# Patient Record
Sex: FEMALE | Race: WHITE | NOT HISPANIC OR LATINO | Employment: OTHER | URBAN - METROPOLITAN AREA
[De-identification: names, ages, dates, MRNs, and addresses within clinical notes are randomized per-mention and may not be internally consistent; named-entity substitution may affect disease eponyms.]

---

## 2017-01-24 ENCOUNTER — OFFICE VISIT (OUTPATIENT)
Dept: RETAIL CLINIC | Facility: CLINIC | Age: 62
End: 2017-01-24

## 2017-01-24 DIAGNOSIS — Z23 NEED FOR VACCINATION: Primary | ICD-10-CM

## 2017-01-24 NOTE — PROGRESS NOTES
Patient presents for flu vaccine, denies any allergies or problems with flu vaccine in past.  VIS given  See scanned forms.

## 2019-07-03 ENCOUNTER — HOSPITAL ENCOUNTER (INPATIENT)
Facility: HOSPITAL | Age: 64
LOS: 2 days | Discharge: HOME | End: 2019-07-05
Attending: EMERGENCY MEDICINE | Admitting: INTERNAL MEDICINE

## 2019-07-03 DIAGNOSIS — K92.1 MELENA: ICD-10-CM

## 2019-07-03 DIAGNOSIS — D62 ACUTE BLOOD LOSS ANEMIA: ICD-10-CM

## 2019-07-03 DIAGNOSIS — D64.9 ANEMIA, UNSPECIFIED TYPE: ICD-10-CM

## 2019-07-03 DIAGNOSIS — K92.2 GASTROINTESTINAL HEMORRHAGE, UNSPECIFIED GASTROINTESTINAL HEMORRHAGE TYPE: Primary | ICD-10-CM

## 2019-07-03 LAB
ABO GROUP BLD: NORMAL
ABO GROUP BLD: NORMAL
ALBUMIN SERPL-MCNC: 4 G/DL (ref 3.5–5.2)
ALBUMIN/GLOB SERPL: 1.7 G/DL
ALP SERPL-CCNC: 39 U/L (ref 39–117)
ALT SERPL W P-5'-P-CCNC: 8 U/L (ref 1–33)
ANION GAP SERPL CALCULATED.3IONS-SCNC: 14 MMOL/L (ref 5–15)
AST SERPL-CCNC: 12 U/L (ref 1–32)
BASOPHILS # BLD AUTO: 0.05 10*3/MM3 (ref 0–0.2)
BASOPHILS NFR BLD AUTO: 0.5 % (ref 0–1.5)
BILIRUB SERPL-MCNC: 0.3 MG/DL (ref 0.2–1.2)
BLD GP AB SCN SERPL QL: NEGATIVE
BUN BLD-MCNC: 12 MG/DL (ref 8–23)
BUN/CREAT SERPL: 13.8 (ref 7–25)
CALCIUM SPEC-SCNC: 8.9 MG/DL (ref 8.6–10.5)
CHLORIDE SERPL-SCNC: 105 MMOL/L (ref 98–107)
CO2 SERPL-SCNC: 22 MMOL/L (ref 22–29)
CREAT BLD-MCNC: 0.87 MG/DL (ref 0.57–1)
DEPRECATED RDW RBC AUTO: 47.6 FL (ref 37–54)
DEVELOPER EXPIRATION DATE: ABNORMAL
DEVELOPER LOT NUMBER: ABNORMAL
EOSINOPHIL # BLD AUTO: 0.05 10*3/MM3 (ref 0–0.4)
EOSINOPHIL NFR BLD AUTO: 0.5 % (ref 0.3–6.2)
ERYTHROCYTE [DISTWIDTH] IN BLOOD BY AUTOMATED COUNT: 16.9 % (ref 12.3–15.4)
EXPIRATION DATE: ABNORMAL
FECAL OCCULT BLOOD SCREEN, POC: POSITIVE
GFR SERPL CREATININE-BSD FRML MDRD: 65 ML/MIN/1.73
GLOBULIN UR ELPH-MCNC: 2.4 GM/DL
GLUCOSE BLD-MCNC: 138 MG/DL (ref 65–99)
HCT VFR BLD AUTO: 18.2 % (ref 34–46.6)
HGB BLD-MCNC: 5 G/DL (ref 12–15.9)
HOLD SPECIMEN: NORMAL
HOLD SPECIMEN: NORMAL
HYPOCHROMIA BLD QL: NORMAL
IMM GRANULOCYTES # BLD AUTO: 0.06 10*3/MM3 (ref 0–0.05)
IMM GRANULOCYTES NFR BLD AUTO: 0.6 % (ref 0–0.5)
LYMPHOCYTES # BLD AUTO: 1.98 10*3/MM3 (ref 0.7–3.1)
LYMPHOCYTES NFR BLD AUTO: 19.8 % (ref 19.6–45.3)
Lab: ABNORMAL
MCH RBC QN AUTO: 21.4 PG (ref 26.6–33)
MCHC RBC AUTO-ENTMCNC: 27.5 G/DL (ref 31.5–35.7)
MCV RBC AUTO: 77.8 FL (ref 79–97)
MONOCYTES # BLD AUTO: 0.59 10*3/MM3 (ref 0.1–0.9)
MONOCYTES NFR BLD AUTO: 5.9 % (ref 5–12)
NEGATIVE CONTROL: NEGATIVE
NEUTROPHILS # BLD AUTO: 7.26 10*3/MM3 (ref 1.7–7)
NEUTROPHILS NFR BLD AUTO: 72.7 % (ref 42.7–76)
NRBC BLD AUTO-RTO: 0.4 /100 WBC (ref 0–0.2)
PLAT MORPH BLD: NORMAL
PLATELET # BLD AUTO: 423 10*3/MM3 (ref 140–450)
PMV BLD AUTO: 9.7 FL (ref 6–12)
POSITIVE CONTROL: POSITIVE
POTASSIUM BLD-SCNC: 3.8 MMOL/L (ref 3.5–5.2)
PROT SERPL-MCNC: 6.4 G/DL (ref 6–8.5)
RBC # BLD AUTO: 2.34 10*6/MM3 (ref 3.77–5.28)
RH BLD: POSITIVE
RH BLD: POSITIVE
SODIUM BLD-SCNC: 141 MMOL/L (ref 136–145)
T&S EXPIRATION DATE: NORMAL
WBC MORPH BLD: NORMAL
WBC NRBC COR # BLD: 9.99 10*3/MM3 (ref 3.4–10.8)
WHOLE BLOOD HOLD SPECIMEN: NORMAL
WHOLE BLOOD HOLD SPECIMEN: NORMAL

## 2019-07-03 PROCEDURE — P9016 RBC LEUKOCYTES REDUCED: HCPCS

## 2019-07-03 PROCEDURE — 86901 BLOOD TYPING SEROLOGIC RH(D): CPT

## 2019-07-03 PROCEDURE — 86900 BLOOD TYPING SEROLOGIC ABO: CPT

## 2019-07-03 PROCEDURE — 85007 BL SMEAR W/DIFF WBC COUNT: CPT | Performed by: NURSE PRACTITIONER

## 2019-07-03 PROCEDURE — 86900 BLOOD TYPING SEROLOGIC ABO: CPT | Performed by: EMERGENCY MEDICINE

## 2019-07-03 PROCEDURE — 99285 EMERGENCY DEPT VISIT HI MDM: CPT

## 2019-07-03 PROCEDURE — 82270 OCCULT BLOOD FECES: CPT | Performed by: EMERGENCY MEDICINE

## 2019-07-03 PROCEDURE — 84466 ASSAY OF TRANSFERRIN: CPT | Performed by: INTERNAL MEDICINE

## 2019-07-03 PROCEDURE — 86923 COMPATIBILITY TEST ELECTRIC: CPT

## 2019-07-03 PROCEDURE — 36430 TRANSFUSION BLD/BLD COMPNT: CPT

## 2019-07-03 PROCEDURE — 82728 ASSAY OF FERRITIN: CPT | Performed by: INTERNAL MEDICINE

## 2019-07-03 PROCEDURE — 80053 COMPREHEN METABOLIC PANEL: CPT | Performed by: NURSE PRACTITIONER

## 2019-07-03 PROCEDURE — 85045 AUTOMATED RETICULOCYTE COUNT: CPT | Performed by: INTERNAL MEDICINE

## 2019-07-03 PROCEDURE — 86901 BLOOD TYPING SEROLOGIC RH(D): CPT | Performed by: EMERGENCY MEDICINE

## 2019-07-03 PROCEDURE — 85025 COMPLETE CBC W/AUTO DIFF WBC: CPT | Performed by: NURSE PRACTITIONER

## 2019-07-03 PROCEDURE — 83540 ASSAY OF IRON: CPT | Performed by: INTERNAL MEDICINE

## 2019-07-03 PROCEDURE — 86850 RBC ANTIBODY SCREEN: CPT | Performed by: EMERGENCY MEDICINE

## 2019-07-03 RX ORDER — PANTOPRAZOLE SODIUM 40 MG/10ML
80 INJECTION, POWDER, LYOPHILIZED, FOR SOLUTION INTRAVENOUS ONCE
Status: COMPLETED | OUTPATIENT
Start: 2019-07-03 | End: 2019-07-03

## 2019-07-03 RX ORDER — MELATONIN
2000 DAILY
COMMUNITY
End: 2019-11-12

## 2019-07-03 RX ORDER — SODIUM CHLORIDE 0.9 % (FLUSH) 0.9 %
10 SYRINGE (ML) INJECTION AS NEEDED
Status: DISCONTINUED | OUTPATIENT
Start: 2019-07-03 | End: 2019-07-05 | Stop reason: HOSPADM

## 2019-07-03 RX ORDER — ACETAMINOPHEN 500 MG
500 TABLET ORAL ONCE
Status: COMPLETED | OUTPATIENT
Start: 2019-07-03 | End: 2019-07-03

## 2019-07-03 RX ADMIN — ACETAMINOPHEN 500 MG: 500 TABLET, FILM COATED ORAL at 22:54

## 2019-07-03 RX ADMIN — PANTOPRAZOLE SODIUM 80 MG: 40 INJECTION, POWDER, FOR SOLUTION INTRAVENOUS at 21:40

## 2019-07-04 PROBLEM — R73.9 HYPERGLYCEMIA: Status: ACTIVE | Noted: 2019-07-04

## 2019-07-04 PROBLEM — D64.9 SYMPTOMATIC ANEMIA: Status: ACTIVE | Noted: 2019-07-04

## 2019-07-04 PROBLEM — D50.9 MICROCYTIC ANEMIA: Status: ACTIVE | Noted: 2019-07-04

## 2019-07-04 PROBLEM — M81.0 OSTEOPOROSIS: Status: ACTIVE | Noted: 2019-07-04

## 2019-07-04 PROBLEM — K92.2 GIB (GASTROINTESTINAL BLEEDING): Status: ACTIVE | Noted: 2019-07-04

## 2019-07-04 LAB
ANION GAP SERPL CALCULATED.3IONS-SCNC: 11 MMOL/L (ref 5–15)
BACTERIA UR QL AUTO: ABNORMAL /HPF
BILIRUB UR QL STRIP: NEGATIVE
BUN BLD-MCNC: 13 MG/DL (ref 8–23)
BUN/CREAT SERPL: 17.1 (ref 7–25)
CALCIUM SPEC-SCNC: 8.4 MG/DL (ref 8.6–10.5)
CHLORIDE SERPL-SCNC: 111 MMOL/L (ref 98–107)
CLARITY UR: ABNORMAL
CO2 SERPL-SCNC: 20 MMOL/L (ref 22–29)
COLOR UR: YELLOW
CREAT BLD-MCNC: 0.76 MG/DL (ref 0.57–1)
FERRITIN SERPL-MCNC: 4.44 NG/ML (ref 13–150)
FOLATE SERPL-MCNC: 9.95 NG/ML (ref 4.78–24.2)
GFR SERPL CREATININE-BSD FRML MDRD: 76 ML/MIN/1.73
GLUCOSE BLD-MCNC: 100 MG/DL (ref 65–99)
GLUCOSE UR STRIP-MCNC: NEGATIVE MG/DL
HCT VFR BLD AUTO: 23.9 % (ref 34–46.6)
HCT VFR BLD AUTO: 24 % (ref 34–46.6)
HCT VFR BLD AUTO: 25.7 % (ref 34–46.6)
HGB BLD-MCNC: 7 G/DL (ref 12–15.9)
HGB BLD-MCNC: 7 G/DL (ref 12–15.9)
HGB BLD-MCNC: 7.3 G/DL (ref 12–15.9)
HGB UR QL STRIP.AUTO: NEGATIVE
HYALINE CASTS UR QL AUTO: ABNORMAL /LPF
INR PPP: 1.12 (ref 0.85–1.16)
IRON 24H UR-MRATE: 13 MCG/DL (ref 37–145)
IRON SATN MFR SERPL: 3 % (ref 20–50)
KETONES UR QL STRIP: NEGATIVE
LEUKOCYTE ESTERASE UR QL STRIP.AUTO: ABNORMAL
NITRITE UR QL STRIP: NEGATIVE
PH UR STRIP.AUTO: 7.5 [PH] (ref 5–8)
POTASSIUM BLD-SCNC: 4.7 MMOL/L (ref 3.5–5.2)
PROT UR QL STRIP: NEGATIVE
PROTHROMBIN TIME: 13.9 SECONDS (ref 11.2–14.3)
RBC # UR: ABNORMAL /HPF
REF LAB TEST METHOD: ABNORMAL
RETICS # AUTO: 0.09 10*6/MM3 (ref 0.02–0.13)
RETICS/RBC NFR AUTO: 3.65 % (ref 0.7–1.9)
SODIUM BLD-SCNC: 142 MMOL/L (ref 136–145)
SP GR UR STRIP: 1.02 (ref 1–1.03)
SQUAMOUS #/AREA URNS HPF: ABNORMAL /HPF
TIBC SERPL-MCNC: 487 MCG/DL (ref 298–536)
TRANSFERRIN SERPL-MCNC: 327 MG/DL (ref 200–360)
UROBILINOGEN UR QL STRIP: ABNORMAL
VIT B12 BLD-MCNC: 261 PG/ML (ref 211–946)
WBC UR QL AUTO: ABNORMAL /HPF

## 2019-07-04 PROCEDURE — 85014 HEMATOCRIT: CPT | Performed by: INTERNAL MEDICINE

## 2019-07-04 PROCEDURE — P9016 RBC LEUKOCYTES REDUCED: HCPCS

## 2019-07-04 PROCEDURE — 99222 1ST HOSP IP/OBS MODERATE 55: CPT | Performed by: INTERNAL MEDICINE

## 2019-07-04 PROCEDURE — 85018 HEMOGLOBIN: CPT | Performed by: INTERNAL MEDICINE

## 2019-07-04 PROCEDURE — 82607 VITAMIN B-12: CPT | Performed by: INTERNAL MEDICINE

## 2019-07-04 PROCEDURE — 85610 PROTHROMBIN TIME: CPT | Performed by: INTERNAL MEDICINE

## 2019-07-04 PROCEDURE — 80048 BASIC METABOLIC PNL TOTAL CA: CPT | Performed by: INTERNAL MEDICINE

## 2019-07-04 PROCEDURE — 99223 1ST HOSP IP/OBS HIGH 75: CPT | Performed by: INTERNAL MEDICINE

## 2019-07-04 PROCEDURE — 86900 BLOOD TYPING SEROLOGIC ABO: CPT

## 2019-07-04 PROCEDURE — 36430 TRANSFUSION BLD/BLD COMPNT: CPT

## 2019-07-04 PROCEDURE — 82746 ASSAY OF FOLIC ACID SERUM: CPT | Performed by: INTERNAL MEDICINE

## 2019-07-04 PROCEDURE — 81001 URINALYSIS AUTO W/SCOPE: CPT | Performed by: NURSE PRACTITIONER

## 2019-07-04 PROCEDURE — 83036 HEMOGLOBIN GLYCOSYLATED A1C: CPT | Performed by: INTERNAL MEDICINE

## 2019-07-04 RX ORDER — SODIUM CHLORIDE 0.9 % (FLUSH) 0.9 %
3-10 SYRINGE (ML) INJECTION AS NEEDED
Status: DISCONTINUED | OUTPATIENT
Start: 2019-07-04 | End: 2019-07-05 | Stop reason: HOSPADM

## 2019-07-04 RX ORDER — MELATONIN
2000 DAILY
Status: DISCONTINUED | OUTPATIENT
Start: 2019-07-04 | End: 2019-07-05 | Stop reason: HOSPADM

## 2019-07-04 RX ORDER — SODIUM CHLORIDE 9 MG/ML
50 INJECTION, SOLUTION INTRAVENOUS ONCE
Status: COMPLETED | OUTPATIENT
Start: 2019-07-04 | End: 2019-07-04

## 2019-07-04 RX ORDER — SODIUM CHLORIDE 0.9 % (FLUSH) 0.9 %
3 SYRINGE (ML) INJECTION EVERY 12 HOURS SCHEDULED
Status: DISCONTINUED | OUTPATIENT
Start: 2019-07-04 | End: 2019-07-05 | Stop reason: HOSPADM

## 2019-07-04 RX ORDER — ACETAMINOPHEN 325 MG/1
650 TABLET ORAL EVERY 4 HOURS PRN
Status: DISCONTINUED | OUTPATIENT
Start: 2019-07-04 | End: 2019-07-05 | Stop reason: HOSPADM

## 2019-07-04 RX ORDER — PANTOPRAZOLE SODIUM 40 MG/10ML
40 INJECTION, POWDER, LYOPHILIZED, FOR SOLUTION INTRAVENOUS EVERY 12 HOURS SCHEDULED
Status: DISCONTINUED | OUTPATIENT
Start: 2019-07-04 | End: 2019-07-05 | Stop reason: HOSPADM

## 2019-07-04 RX ADMIN — SODIUM CHLORIDE, PRESERVATIVE FREE 3 ML: 5 INJECTION INTRAVENOUS at 09:30

## 2019-07-04 RX ADMIN — PANTOPRAZOLE SODIUM 40 MG: 40 INJECTION, POWDER, FOR SOLUTION INTRAVENOUS at 09:30

## 2019-07-04 RX ADMIN — PANTOPRAZOLE SODIUM 40 MG: 40 INJECTION, POWDER, FOR SOLUTION INTRAVENOUS at 20:04

## 2019-07-04 RX ADMIN — SODIUM CHLORIDE 50 ML/HR: 9 INJECTION, SOLUTION INTRAVENOUS at 03:16

## 2019-07-04 RX ADMIN — SODIUM CHLORIDE, PRESERVATIVE FREE 3 ML: 5 INJECTION INTRAVENOUS at 03:15

## 2019-07-04 RX ADMIN — SODIUM CHLORIDE, PRESERVATIVE FREE 3 ML: 5 INJECTION INTRAVENOUS at 20:04

## 2019-07-04 RX ADMIN — VITAMIN D, TAB 1000IU (100/BT) 2000 UNITS: 25 TAB at 09:30

## 2019-07-04 NOTE — PLAN OF CARE
Problem: Pain, Chronic (Adult)  Goal: Identify Related Risk Factors and Signs and Symptoms  Outcome: Ongoing (interventions implemented as appropriate)      Problem: Patient Care Overview  Goal: Plan of Care Review  Outcome: Ongoing (interventions implemented as appropriate)   07/04/19 5789   Coping/Psychosocial   Plan of Care Reviewed With patient   Plan of Care Review   Progress improving   OTHER   Outcome Summary No s/s of distress or discomfort noted. H&H improved after having two units of PRBC's. Denies pain. H&H's every 12 hours. Will continue to monitor. Call light within reach.

## 2019-07-04 NOTE — CONSULTS
[facility] [place] [facility]    Referring Provider: Glendy Schultz MD    PCP: Micha Hernandez MD    Reason for Consultation: Anemia.    Chief complaint: Weakness    History of present illness:    Zulema Jarquin is a 63 y.o. female who is admitted with a one-week history of generalized weakness.  There is associated dyspnea with exertion and heart palpitations.  Symptoms did not improve with time.  Symptoms improved with rest.  Laboratory studies showed severe anemia and patient was referred to the emergency room.  Patient takes NSAIDs regularly, and noted melena for the past week.    Allergies:  Patient has no known allergies.    Scheduled Meds:    cholecalciferol 2,000 Units Oral Daily   pantoprazole 40 mg Intravenous Q12H   sodium chloride 3 mL Intravenous Q12H        Infusions:       PRN Meds:  •  acetaminophen  •  [COMPLETED] Insert peripheral IV **AND** sodium chloride  •  sodium chloride    Home Meds:  Medications Prior to Admission   Medication Sig Dispense Refill Last Dose   • Alendronate Sodium (FOSAMAX PO) Take  by mouth Daily.      • cholecalciferol (VITAMIN D3) 1000 units tablet Take 2,000 Units by mouth Daily.          ROS: Review of Systems   Constitutional: Positive for fatigue. Negative for appetite change, chills, diaphoresis, fever and unexpected weight change.   HENT: Negative.  Negative for mouth sores, nosebleeds and trouble swallowing.    Eyes: Negative.    Respiratory: Positive for shortness of breath. Negative for cough, chest tightness, wheezing and stridor.    Cardiovascular: Positive for palpitations. Negative for chest pain and leg swelling.   Gastrointestinal: Positive for blood in stool. Negative for abdominal distention, abdominal pain, constipation, diarrhea, nausea and vomiting.   Endocrine: Negative.    Genitourinary: Negative.  Negative for difficulty urinating and dysuria.   Musculoskeletal: Positive for arthralgias and back pain.   Skin: Negative.  Negative for color change,  "pallor and rash.   Allergic/Immunologic: Negative.    Neurological: Positive for weakness and light-headedness. Negative for tremors, seizures, syncope and headaches.   Hematological: Negative.  Negative for adenopathy. Does not bruise/bleed easily.   Psychiatric/Behavioral: Negative.  Negative for agitation and behavioral problems.       PAST MED HX:  History reviewed. No pertinent past medical history.    PAST SURG HX:  Past Surgical History:   Procedure Laterality Date   • CARPAL TUNNEL RELEASE         FAM HX:  History reviewed. No pertinent family history.    SOC HX:  Social History     Socioeconomic History   • Marital status:      Spouse name: Not on file   • Number of children: Not on file   • Years of education: Not on file   • Highest education level: Not on file   Tobacco Use   • Smoking status: Never Smoker   Substance and Sexual Activity   • Alcohol use: Yes     Comment: rarely   • Drug use: No       PHYSICAL EXAM  /69 (BP Location: Left arm, Patient Position: Lying)   Pulse 79   Temp 98.2 °F (36.8 °C) (Oral)   Resp 18   Ht 154.9 cm (61\")   Wt 72.6 kg (160 lb)   SpO2 98%   BMI 30.23 kg/m²   Wt Readings from Last 3 Encounters:   07/04/19 72.6 kg (160 lb)   12/02/14 75.3 kg (166 lb 0.1 oz)   11/21/13 75.3 kg (166 lb 0.1 oz)   ,body mass index is 30.23 kg/m².  Physical Exam   Constitutional: She is oriented to person, place, and time. She appears well-developed and well-nourished. No distress.   HENT:   Head: Normocephalic.   Mouth/Throat: No oropharyngeal exudate.   Eyes: Conjunctivae are normal. No scleral icterus.   Neck: Normal range of motion.   Cardiovascular: Normal rate and regular rhythm.   Pulmonary/Chest: Effort normal and breath sounds normal. No stridor. No respiratory distress. She has no wheezes. She has no rales.   Abdominal: Soft. Bowel sounds are normal. She exhibits no distension and no mass. There is no tenderness. There is no guarding.   Genitourinary: "   Genitourinary Comments: Deferred   Musculoskeletal: Normal range of motion. She exhibits no edema.   Neurological: She is alert and oriented to person, place, and time.   Skin: Skin is warm and dry. Capillary refill takes less than 2 seconds. No rash noted.   Psychiatric: She has a normal mood and affect. Her behavior is normal.   Nursing note and vitals reviewed.    Results Review:   I reviewed the patient's new clinical results.    Lab Results   Component Value Date    WBC 9.99 07/03/2019    HGB 7.0 (L) 07/04/2019    HGB 7.0 (L) 07/04/2019    HGB 5.0 (C) 07/03/2019    HCT 23.9 (L) 07/04/2019    MCV 77.8 (L) 07/03/2019     07/03/2019       Lab Results   Component Value Date    INR 1.12 07/04/2019       Lab Results   Component Value Date    GLUCOSE 100 (H) 07/04/2019    BUN 13 07/04/2019    CREATININE 0.76 07/04/2019    EGFRIFNONA 76 07/04/2019    BCR 17.1 07/04/2019    CO2 20.0 (L) 07/04/2019    CALCIUM 8.4 (L) 07/04/2019    ALBUMIN 4.00 07/03/2019    ALKPHOS 39 07/03/2019    BILITOT 0.3 07/03/2019    ALT 8 07/03/2019    AST 12 07/03/2019       ASSESSMENTS/PLANS    Acute blood loss anemia.  Chronic blood loss anemia.  Melena.  Chronic NSAID use.    >> Plan EGD tomorrow.  >> Agree with IV PPI.  >> Recommend colonoscopy as an outpatient, her last exam was nearly 10 years ago, and there is chronicity to her anemia.    I discussed the patients findings and my recommendations with patient and family.    Mark I. Brunner, MD  07/04/19  7:35 PM

## 2019-07-04 NOTE — PROGRESS NOTES
[facility] Medicine Services  PROGRESS NOTE    Patient Name: Zulema Jarquin  : 1955  MRN: [id number]    Date of Admission: 7/3/2019  Length of Stay: 1  Primary Care Physician: Micha Hernandez MD    Subjective   Subjective     CC:  Weakness and melena    HPI:  Feeling fine, no chest pain.   Melena has been intermittent for a couple weeks.  No abd pain.     Review of Systems   No fever  No chest pain, + palpits recently  No dizziness      Otherwise ROS is negative except as mentioned in the HPI.    Objective   Objective     Vital Signs:   Temp:  [98.2 °F (36.8 °C)-99.5 °F (37.5 °C)] 98.2 °F (36.8 °C)  Heart Rate:  [] 93  Resp:  [16-20] 18  BP: (105-142)/(52-91) 126/66        Physical Exam:  Gen:  WD/WN woman in bed, NAD,  present.  Very pleasant and a good historian  Neuro: alert and oriented, clear speech, follows commands, grossly nonfocal  HEENT:  NC/AT PERRL, OP benign  Neck:  Supple, no LAD  Heart RRR no murmur, rub, or gallop  Lungs CTA nonlabored  Abd:  Soft, nontender, no rebound or guarding, pos BS  Extrem:  No c/c/e    Results Reviewed:  I have personally reviewed current lab, radiology, and data and agree.    Results from last 7 days   Lab Units 19   WBC 10*3/mm3  --   --  9.99   HEMOGLOBIN g/dL 7.0* 7.0* 5.0*   HEMATOCRIT % 23.9* 24.0* 18.2*   PLATELETS 10*3/mm3  --   --  423   INR   --  1.12  --      Results from last 7 days   Lab Units 19   SODIUM mmol/L 142 141   POTASSIUM mmol/L 4.7 3.8   CHLORIDE mmol/L 111* 105   CO2 mmol/L 20.0* 22.0   BUN mg/dL 13 12   CREATININE mg/dL 0.76 0.87   GLUCOSE mg/dL 100* 138*   CALCIUM mg/dL 8.4* 8.9   ALT (SGPT) U/L  --  8   AST (SGOT) U/L  --  12     Estimated Creatinine Clearance: 63.9 mL/min (by C-G formula based on SCr of 0.76 mg/dL).    Microbiology Results Abnormal     None          Imaging Results (last 24 hours)     ** No results found for the  last 24 hours. **               I have reviewed the medications:  Scheduled Meds:  cholecalciferol 2,000 Units Oral Daily   pantoprazole 40 mg Intravenous Q12H   sodium chloride 3 mL Intravenous Q12H     Continuous Infusions:   PRN Meds:.•  acetaminophen  •  [COMPLETED] Insert peripheral IV **AND** sodium chloride  •  sodium chloride      Assessment/Plan   Assessment / Plan     Active Hospital Problems    Diagnosis  POA   • GIB (gastrointestinal bleeding) [K92.2]  Yes   • Symptomatic anemia [D64.9]  Yes   • Microcytic anemia [D50.9]  Yes   • Hyperglycemia [R73.9]  Yes   • Osteoporosis [M81.0]  Yes      Resolved Hospital Problems   No resolved problems to display.        Brief Hospital Course to date:  Zulema Jarquin is a 63 y.o. female  On Fosamax and ibuprofen, here with weakness, melena and hgb of 5.0.  Microcytic.     A.p   Anemia, GI bleed  - suspect slow and chronic and upper.  Likely PUD, could be esoph from fosamax  - GI consulted.  - txd 2units prbc   - PPI bid    Hyperglycemia  - Hemoglobin A1c     DVT Prophylaxis:  [facility]    Disposition: I expect the patient to be discharged tbd.    CODE STATUS:   Code Status and Medical Interventions:   Ordered at: 07/04/19 0038     Level Of Support Discussed With:    Patient     Code Status:    CPR     Medical Interventions (Level of Support Prior to Arrest):    Full         Electronically signed by Glendy Schultz MD, 07/04/19, 8:34 AM.

## 2019-07-04 NOTE — PROGRESS NOTES
Discharge Planning Assessment  [facility]     Patient Name: Zulema Jarquin  MRN: [id number]  Today's Date: 7/4/2019    Admit Date: 7/3/2019    Discharge Needs Assessment     Row Name 07/04/19 0902       Living Environment    Lives With  spouse    Current Living Arrangements  home/apartment/condo    Living Arrangement Comments  Spouse can assist as needed.       Discharge Needs Assessment    Equipment Currently Used at Home  none    Equipment Needed After Discharge  none    Discharge Coordination/Progress  Has medication coverage. No HH involved.        Discharge Plan     Row Name 07/04/19 0902       Plan    Plan  Home at DC    Patient/Family in Agreement with Plan  yes    Plan Comments  I spoke with the pt. She has no DC needs at this time.    Row Name 07/04/19 0810       Plan    Final Discharge Disposition Code  01 - home or self-care        Destination      No service coordination in this encounter.      Durable Medical Equipment      No service coordination in this encounter.      Dialysis/Infusion      No service coordination in this encounter.      Home Medical Care      No service coordination in this encounter.      Therapy      No service coordination in this encounter.      Community Resources      No service coordination in this encounter.        Expected Discharge Date and Time     Expected Discharge Date Expected Discharge Time    Jul 7, 2019         Demographic Summary    No documentation.       Functional Status     Row Name 07/04/19 0901       Functional Status    Usual Activity Tolerance  good       Functional Status, IADL    Medications  independent    Meal Preparation  independent    Housekeeping  independent    Laundry  independent    Shopping  independent        Psychosocial    No documentation.       Abuse/Neglect    No documentation.       Legal    No documentation.       Substance Abuse    No documentation.       Patient Forms    No documentation.           Florence Gross RN

## 2019-07-04 NOTE — PLAN OF CARE
Problem: Pain, Chronic (Adult)  Goal: Acceptable Pain/Comfort Level and Functional Ability  Outcome: Ongoing (interventions implemented as appropriate)   07/04/19 0520   Pain, Chronic (Adult)   Acceptable Pain/Comfort Level and Functional Ability making progress toward outcome

## 2019-07-04 NOTE — ED PROVIDER NOTES
Subjective   Patient has been feeling bad for several weeks.  She went to her regular doctor today who did lab work and the patient was called and told her blood counts were 5 and she did go to the ER.  Patient tells me she has had fatigue and trouble breathing for the last couple weeks and is sleeping easily.  She gets winded as well.  She denies any black or bloody stool.  There is no abdominal pain however she does get indigestion fairly regular and she does have to take an acids.  She tells me she occasionally takes NSAIDs.  Tells me her last colonoscopy was around 7 or 8 years ago.  Was reportedly okay.        Fatigue   Severity:  Moderate  Onset quality:  Gradual  Duration:  2 months  Timing:  Constant  Progression:  Worsening  Chronicity:  New  Relieved by:  Nothing  Worsened by:  Exertion  Associated symptoms: fatigue and shortness of breath    Associated symptoms: no abdominal pain, no chest pain, no congestion, no cough, no diarrhea, no fever, no nausea, no sore throat, no vomiting and no wheezing        Review of Systems   Constitutional: Positive for fatigue. Negative for chills, diaphoresis and fever.   HENT: Negative for congestion and sore throat.    Respiratory: Positive for shortness of breath. Negative for cough, choking, chest tightness and wheezing.    Cardiovascular: Negative for chest pain and leg swelling.   Gastrointestinal: Negative for abdominal distention, abdominal pain, anal bleeding, blood in stool, constipation, diarrhea, nausea and vomiting.   Genitourinary: Negative for difficulty urinating, dysuria, flank pain, frequency, hematuria and urgency.   All other systems reviewed and are negative.      History reviewed. No pertinent past medical history.    No Known Allergies    Past Surgical History:   Procedure Laterality Date   • CARPAL TUNNEL RELEASE         History reviewed. No pertinent family history.    Social History     Socioeconomic History   • Marital status:      Spouse  name: Not on file   • Number of children: Not on file   • Years of education: Not on file   • Highest education level: Not on file   Tobacco Use   • Smoking status: Never Smoker   Substance and Sexual Activity   • Alcohol use: Yes     Comment: rarely   • Drug use: No           Objective   Physical Exam   Constitutional: She is oriented to person, place, and time. She appears distressed.   Frail. Ill appearing. Pale.   HENT:   Head: Normocephalic and atraumatic.   Right Ear: External ear normal.   Left Ear: External ear normal.   Nose: Nose normal.   Mouth/Throat: Oropharynx is clear and moist.   Eyes: Conjunctivae and EOM are normal. Pupils are equal, round, and reactive to light.   Neck: Normal range of motion. Neck supple.   Cardiovascular: Normal rate, regular rhythm, normal heart sounds and intact distal pulses.   Pulmonary/Chest: Effort normal and breath sounds normal.   Abdominal: Soft. Bowel sounds are normal.   Musculoskeletal: Normal range of motion.   Neurological: She is alert and oriented to person, place, and time.   Skin: Skin is warm and dry. There is pallor.   Psychiatric: She has a normal mood and affect. Her behavior is normal. Judgment and thought content normal.       Critical Care  Performed by: Davon Mcintosh APRN  Authorized by: Gregory Santillan MD     Critical care provider statement:     Critical care time (minutes):  35    Critical care was necessary to treat or prevent imminent or life-threatening deterioration of the following conditions:  Circulatory failure    Critical care was time spent personally by me on the following activities:  Ordering and performing treatments and interventions, ordering and review of laboratory studies, pulse oximetry, re-evaluation of patient's condition, review of old charts, obtaining history from patient or surrogate, examination of patient, evaluation of patient's response to treatment, discussions with consultants and development of treatment plan  with patient or surrogate               ED Course  ED Course as of Jul 03 2139 Wed Jul 03, 2019 2119 Dr. Beauchamp paged  []   2125 Patient will get critical care based on profound anemia.  GI bleeding.  Serial evaluations.  []   2126 Blood transfusion.  [JM]      ED Course User Index  [JM] Davon Mcintosh APRN                  [facility]      Final diagnoses:   Gastrointestinal hemorrhage, unspecified gastrointestinal hemorrhage type   Anemia, unspecified type            Davon Mcintosh APRN  07/03/19 2139

## 2019-07-04 NOTE — H&P
"    [facility] Medicine Services  HISTORY AND PHYSICAL    Patient Name: Zulema Jarquin  : 1955  MRN: [id number]  Primary Care Physician: Micha Henrandez MD  Date of admission: 7/3/2019      Subjective   Subjective     Chief Complaint:  Weakness, abnormal labs    HPI:  Zulema Jarquin is a 63 y.o. female with a PMH significant for osteoporosis who presents to the ED after abnormal labs were drawn outpatient.  Patient reports a one-week history of worsening weakness, dyspnea on exertion, palpitations.  She states that her symptoms became worse each day.  She was seen by her PCP today.  Laboratory data revealed low hemoglobin and patient was instructed to come to the ED for further evaluation.  She reports \"dark \"bowel movements over the past 1 week.  She takes ibuprofen for aches and pains approximately 3-4 times a week.  She had a colonoscopy and EGD 7 years ago which were reported as normal.    Review of Systems   Constitutional: Positive for activity change and fatigue.   HENT: Negative.    Eyes: Negative.    Respiratory: Positive for shortness of breath.    Cardiovascular: Positive for palpitations.   Gastrointestinal: Positive for blood in stool.   Endocrine: Negative.    Genitourinary: Negative.    Musculoskeletal: Negative.    Skin: Negative.    Allergic/Immunologic: Negative.    Neurological: Positive for weakness.   Hematological: Negative.    Psychiatric/Behavioral: Negative.       Otherwise complete ROS reviewed and is negative except as mentioned in the HPI.    Personal History     History reviewed. No pertinent past medical history.    Past Surgical History:   Procedure Laterality Date   • CARPAL TUNNEL RELEASE         Family History: family history is not on file. Otherwise pertinent FHx was reviewed and unremarkable.     Social History:  reports that she has never smoked. She does not have any smokeless tobacco history on file. She reports that she drinks alcohol. She " reports that she does not use drugs.  Social History     Social History Narrative   • Not on file       Medications:    Available home medication information reviewed.    (Not in a hospital admission)    No Known Allergies    Objective   Objective     Vital Signs:   Temp:  [98.5 °F (36.9 °C)-99.5 °F (37.5 °C)] 98.9 °F (37.2 °C)  Heart Rate:  [100-108] 106  Resp:  [16-20] 20  BP: (109-142)/(60-91) 119/65        Physical Exam   Constitutional: Awake, alert  Eyes: PERRLA, sclerae anicteric, no conjunctival injection  HENT: NCAT, mucous membranes moist  Neck: Supple, no thyromegaly, no lymphadenopathy, trachea midline  Respiratory: Clear to auscultation bilaterally, nonlabored respirations   Cardiovascular: RRR, no murmurs, rubs, or gallops, palpable pedal pulses bilaterally  Gastrointestinal: Positive bowel sounds, soft, nontender, nondistended  Musculoskeletal: No bilateral ankle edema, no clubbing or cyanosis to extremities  Psychiatric: Appropriate affect, cooperative  Neurologic: Oriented x 3, strength symmetric in all extremities, Cranial Nerves grossly intact to confrontation, speech clear  Skin: No rashes      Results Reviewed:  I have personally reviewed current lab, radiology, and data and agree.    Results from last 7 days   Lab Units 07/03/19 2013   WBC 10*3/mm3 9.99   HEMOGLOBIN g/dL 5.0*   HEMATOCRIT % 18.2*   PLATELETS 10*3/mm3 423     Results from last 7 days   Lab Units 07/03/19 2012   SODIUM mmol/L 141   POTASSIUM mmol/L 3.8   CHLORIDE mmol/L 105   CO2 mmol/L 22.0   BUN mg/dL 12   CREATININE mg/dL 0.87   GLUCOSE mg/dL 138*   CALCIUM mg/dL 8.9   ALT (SGPT) U/L 8   AST (SGOT) U/L 12     Estimated Creatinine Clearance: 58.7 mL/min (by C-G formula based on SCr of 0.87 mg/dL).  Brief Urine Lab Results     None        Imaging Results (last 24 hours)     ** No results found for the last 24 hours. **             Assessment/Plan   Assessment / Plan     Active Hospital Problems    Diagnosis POA   • GIB  (gastrointestinal bleeding) [K92.2] Yes   • Symptomatic anemia [D64.9] Yes   • Microcytic anemia [D50.9] Yes   • Hyperglycemia [R73.9] Yes   • Osteoporosis [M81.0] Yes     This is a 63-year-old female patient with a past medical history significant for osteoporosis who presents to the ED after worsening weakness for 1 week with outpatient labs showing microcytic anemia.    Symptomatic microcytic anemia due to GI bleed  - N.p.o. after midnight  -Gastroenterology consult  - Transfusing 2 units PRBCs  -Trend hemoglobin every 4 hours  -Anemia studies  - Gentle IV fluids overnight  -Monitor on telemetry with continuous pulse ox  - Protonix IV 40 mg twice daily  - Fall precautions    Hyperglycemia  - Hemoglobin A1c    Osteoporosis  -Continue home Fosamax      DVT prophylaxis: CDs    CODE STATUS:    Code Status and Medical Interventions:   Ordered at: 07/04/19 0038     Level Of Support Discussed With:    Patient     Code Status:    CPR     Medical Interventions (Level of Support Prior to Arrest):    Full       Admission Status:  I believe this patient meets INPATIENT status due to the need for care which can only be reasonably provided in an hospital setting of symptomatic anemia.  In such, I feel patient’s risk for adverse outcomes and need for care warrant INPATIENT evaluation and predict the patient’s care encounter to likely last beyond 2 midnights.      Electronically signed by Maddy Beauchamp DO, 07/04/19, 12:41 AM.

## 2019-07-05 ENCOUNTER — ANESTHESIA (OUTPATIENT)
Dept: GASTROENTEROLOGY | Facility: HOSPITAL | Age: 64
End: 2019-07-05

## 2019-07-05 ENCOUNTER — ANESTHESIA EVENT (OUTPATIENT)
Dept: GASTROENTEROLOGY | Facility: HOSPITAL | Age: 64
End: 2019-07-05

## 2019-07-05 VITALS
RESPIRATION RATE: 18 BRPM | SYSTOLIC BLOOD PRESSURE: 137 MMHG | OXYGEN SATURATION: 100 % | HEART RATE: 98 BPM | WEIGHT: 160 LBS | TEMPERATURE: 97.8 F | HEIGHT: 61 IN | BODY MASS INDEX: 30.21 KG/M2 | DIASTOLIC BLOOD PRESSURE: 87 MMHG

## 2019-07-05 PROBLEM — K25.7: Chronic | Status: ACTIVE | Noted: 2019-07-05

## 2019-07-05 PROBLEM — K44.9 HERNIA, HIATAL: Chronic | Status: ACTIVE | Noted: 2019-07-05

## 2019-07-05 PROBLEM — D62 ACUTE BLOOD LOSS ANEMIA: Status: ACTIVE | Noted: 2019-07-03

## 2019-07-05 PROBLEM — K92.1 MELENA: Status: RESOLVED | Noted: 2019-07-03 | Resolved: 2019-07-05

## 2019-07-05 PROBLEM — K92.1 MELENA: Status: ACTIVE | Noted: 2019-07-03

## 2019-07-05 PROBLEM — D50.9 MICROCYTIC ANEMIA: Status: RESOLVED | Noted: 2019-07-04 | Resolved: 2019-07-05

## 2019-07-05 LAB
ABO + RH BLD: NORMAL
ABO + RH BLD: NORMAL
ANION GAP SERPL CALCULATED.3IONS-SCNC: 9 MMOL/L (ref 5–15)
BH BB BLOOD EXPIRATION DATE: NORMAL
BH BB BLOOD EXPIRATION DATE: NORMAL
BH BB BLOOD TYPE BARCODE: 5100
BH BB BLOOD TYPE BARCODE: 5100
BH BB DISPENSE STATUS: NORMAL
BH BB DISPENSE STATUS: NORMAL
BH BB PRODUCT CODE: NORMAL
BH BB PRODUCT CODE: NORMAL
BH BB UNIT NUMBER: NORMAL
BH BB UNIT NUMBER: NORMAL
BUN BLD-MCNC: 7 MG/DL (ref 8–23)
BUN/CREAT SERPL: 9.2 (ref 7–25)
CALCIUM SPEC-SCNC: 8.3 MG/DL (ref 8.6–10.5)
CHLORIDE SERPL-SCNC: 111 MMOL/L (ref 98–107)
CO2 SERPL-SCNC: 23 MMOL/L (ref 22–29)
CREAT BLD-MCNC: 0.76 MG/DL (ref 0.57–1)
GFR SERPL CREATININE-BSD FRML MDRD: 76 ML/MIN/1.73
GLUCOSE BLD-MCNC: 99 MG/DL (ref 65–99)
HCT VFR BLD AUTO: 25.8 % (ref 34–46.6)
HGB BLD-MCNC: 7.5 G/DL (ref 12–15.9)
POTASSIUM BLD-SCNC: 4.5 MMOL/L (ref 3.5–5.2)
SODIUM BLD-SCNC: 143 MMOL/L (ref 136–145)
UNIT  ABO: NORMAL
UNIT  ABO: NORMAL
UNIT  RH: NORMAL
UNIT  RH: NORMAL

## 2019-07-05 PROCEDURE — 25010000003 LIDOCAINE 1 % SOLUTION: Performed by: NURSE ANESTHETIST, CERTIFIED REGISTERED

## 2019-07-05 PROCEDURE — 85014 HEMATOCRIT: CPT | Performed by: INTERNAL MEDICINE

## 2019-07-05 PROCEDURE — 80048 BASIC METABOLIC PNL TOTAL CA: CPT | Performed by: INTERNAL MEDICINE

## 2019-07-05 PROCEDURE — 85018 HEMOGLOBIN: CPT | Performed by: INTERNAL MEDICINE

## 2019-07-05 PROCEDURE — 25010000002 PROPOFOL 10 MG/ML EMULSION: Performed by: NURSE ANESTHETIST, CERTIFIED REGISTERED

## 2019-07-05 PROCEDURE — 99239 HOSP IP/OBS DSCHRG MGMT >30: CPT | Performed by: INTERNAL MEDICINE

## 2019-07-05 PROCEDURE — 0DB68ZX EXCISION OF STOMACH, VIA NATURAL OR ARTIFICIAL OPENING ENDOSCOPIC, DIAGNOSTIC: ICD-10-PCS | Performed by: INTERNAL MEDICINE

## 2019-07-05 PROCEDURE — 88305 TISSUE EXAM BY PATHOLOGIST: CPT | Performed by: INTERNAL MEDICINE

## 2019-07-05 PROCEDURE — 25010000002 NA FERRIC GLUC CPLX PER 12.5 MG: Performed by: INTERNAL MEDICINE

## 2019-07-05 RX ORDER — SENNA AND DOCUSATE SODIUM 50; 8.6 MG/1; MG/1
1 TABLET, FILM COATED ORAL DAILY
Qty: 30 TABLET | Refills: 5 | Status: SHIPPED | OUTPATIENT
Start: 2019-07-05 | End: 2019-11-12

## 2019-07-05 RX ORDER — LABETALOL HYDROCHLORIDE 5 MG/ML
5 INJECTION, SOLUTION INTRAVENOUS
Status: DISCONTINUED | OUTPATIENT
Start: 2019-07-05 | End: 2019-07-05 | Stop reason: HOSPADM

## 2019-07-05 RX ORDER — FERROUS SULFATE 325(65) MG
325 TABLET ORAL
Qty: 30 TABLET | Refills: 5 | Status: SHIPPED | OUTPATIENT
Start: 2019-07-05 | End: 2019-11-12

## 2019-07-05 RX ORDER — PROPOFOL 10 MG/ML
VIAL (ML) INTRAVENOUS CONTINUOUS PRN
Status: DISCONTINUED | OUTPATIENT
Start: 2019-07-05 | End: 2019-07-05 | Stop reason: SURG

## 2019-07-05 RX ORDER — PROPOFOL 10 MG/ML
VIAL (ML) INTRAVENOUS AS NEEDED
Status: DISCONTINUED | OUTPATIENT
Start: 2019-07-05 | End: 2019-07-05 | Stop reason: SURG

## 2019-07-05 RX ORDER — SODIUM CHLORIDE, SODIUM LACTATE, POTASSIUM CHLORIDE, CALCIUM CHLORIDE 600; 310; 30; 20 MG/100ML; MG/100ML; MG/100ML; MG/100ML
20 INJECTION, SOLUTION INTRAVENOUS CONTINUOUS
Status: DISCONTINUED | OUTPATIENT
Start: 2019-07-05 | End: 2019-07-05 | Stop reason: HOSPADM

## 2019-07-05 RX ORDER — HYDRALAZINE HYDROCHLORIDE 20 MG/ML
5 INJECTION INTRAMUSCULAR; INTRAVENOUS
Status: DISCONTINUED | OUTPATIENT
Start: 2019-07-05 | End: 2019-07-05 | Stop reason: HOSPADM

## 2019-07-05 RX ORDER — ONDANSETRON 2 MG/ML
4 INJECTION INTRAMUSCULAR; INTRAVENOUS ONCE AS NEEDED
Status: DISCONTINUED | OUTPATIENT
Start: 2019-07-05 | End: 2019-07-05 | Stop reason: HOSPADM

## 2019-07-05 RX ORDER — PROMETHAZINE HYDROCHLORIDE 25 MG/ML
6.25 INJECTION, SOLUTION INTRAMUSCULAR; INTRAVENOUS ONCE AS NEEDED
Status: DISCONTINUED | OUTPATIENT
Start: 2019-07-05 | End: 2019-07-05 | Stop reason: HOSPADM

## 2019-07-05 RX ORDER — PROMETHAZINE HYDROCHLORIDE 25 MG/1
25 SUPPOSITORY RECTAL ONCE AS NEEDED
Status: DISCONTINUED | OUTPATIENT
Start: 2019-07-05 | End: 2019-07-05 | Stop reason: HOSPADM

## 2019-07-05 RX ORDER — SODIUM CHLORIDE, SODIUM LACTATE, POTASSIUM CHLORIDE, CALCIUM CHLORIDE 600; 310; 30; 20 MG/100ML; MG/100ML; MG/100ML; MG/100ML
INJECTION, SOLUTION INTRAVENOUS CONTINUOUS PRN
Status: DISCONTINUED | OUTPATIENT
Start: 2019-07-05 | End: 2019-07-05 | Stop reason: SURG

## 2019-07-05 RX ORDER — PANTOPRAZOLE SODIUM 40 MG/1
40 TABLET, DELAYED RELEASE ORAL DAILY
Qty: 30 TABLET | Refills: 11 | Status: SHIPPED | OUTPATIENT
Start: 2019-07-05 | End: 2022-05-24

## 2019-07-05 RX ORDER — ASCORBIC ACID 500 MG
500 TABLET ORAL DAILY
Qty: 30 TABLET | Refills: 5 | Status: SHIPPED | OUTPATIENT
Start: 2019-07-05 | End: 2019-11-12

## 2019-07-05 RX ORDER — LIDOCAINE HYDROCHLORIDE 10 MG/ML
INJECTION, SOLUTION INFILTRATION; PERINEURAL AS NEEDED
Status: DISCONTINUED | OUTPATIENT
Start: 2019-07-05 | End: 2019-07-05 | Stop reason: SURG

## 2019-07-05 RX ORDER — IPRATROPIUM BROMIDE AND ALBUTEROL SULFATE 2.5; .5 MG/3ML; MG/3ML
3 SOLUTION RESPIRATORY (INHALATION) ONCE AS NEEDED
Status: DISCONTINUED | OUTPATIENT
Start: 2019-07-05 | End: 2019-07-05 | Stop reason: HOSPADM

## 2019-07-05 RX ORDER — PROMETHAZINE HYDROCHLORIDE 25 MG/1
25 TABLET ORAL ONCE AS NEEDED
Status: DISCONTINUED | OUTPATIENT
Start: 2019-07-05 | End: 2019-07-05 | Stop reason: HOSPADM

## 2019-07-05 RX ADMIN — LIDOCAINE HYDROCHLORIDE 100 MG: 10 INJECTION, SOLUTION INFILTRATION; PERINEURAL at 09:14

## 2019-07-05 RX ADMIN — SODIUM CHLORIDE, PRESERVATIVE FREE 3 ML: 5 INJECTION INTRAVENOUS at 08:24

## 2019-07-05 RX ADMIN — SODIUM CHLORIDE 125 MG: 9 INJECTION, SOLUTION INTRAVENOUS at 10:42

## 2019-07-05 RX ADMIN — SODIUM CHLORIDE, POTASSIUM CHLORIDE, SODIUM LACTATE AND CALCIUM CHLORIDE: 600; 310; 30; 20 INJECTION, SOLUTION INTRAVENOUS at 09:04

## 2019-07-05 RX ADMIN — SODIUM CHLORIDE, POTASSIUM CHLORIDE, SODIUM LACTATE AND CALCIUM CHLORIDE 20 ML/HR: 600; 310; 30; 20 INJECTION, SOLUTION INTRAVENOUS at 08:48

## 2019-07-05 RX ADMIN — PANTOPRAZOLE SODIUM 40 MG: 40 INJECTION, POWDER, FOR SOLUTION INTRAVENOUS at 08:24

## 2019-07-05 RX ADMIN — PROPOFOL 100 MG: 10 INJECTION, EMULSION INTRAVENOUS at 09:14

## 2019-07-05 RX ADMIN — VITAMIN D, TAB 1000IU (100/BT) 2000 UNITS: 25 TAB at 10:40

## 2019-07-05 RX ADMIN — PROPOFOL 100 MCG/KG/MIN: 10 INJECTION, EMULSION INTRAVENOUS at 09:14

## 2019-07-05 NOTE — ANESTHESIA PREPROCEDURE EVALUATION
Anesthesia Evaluation     Patient summary reviewed and Nursing notes reviewed   NPO Solid Status: > 8 hours  NPO Liquid Status: > 8 hours           Airway   Mallampati: III  TM distance: >3 FB  Neck ROM: limited  Possible difficult intubation  Dental      Pulmonary    (-) shortness of breath, recent URI, not a smokerAsthma: mild in past allergies.  Cardiovascular     (+) valvular problems/murmurs (MVP no rx needed now ) MVP,   (-) hypertension, past MI, dysrhythmias, angina, hyperlipidemia      Neuro/Psych  (-) seizures, CVA  GI/Hepatic/Renal/Endo    (+)  GI bleeding,   (-) liver disease, no renal disease, diabetes, hypothyroidism    Musculoskeletal     Abdominal    Substance History      OB/GYN          Other        ROS/Med Hx Other: HCT 25                 Anesthesia Plan    ASA 2     MAC   (PFL )  intravenous induction   Anesthetic plan, all risks, benefits, and alternatives have been provided, discussed and informed consent has been obtained with: patient.    Plan discussed with CRNA.

## 2019-07-05 NOTE — PROGRESS NOTES
Case Management Discharge Note    Final Note: Spoke with pt and pts spouse in room regarding discharge plan.  Plan is home with spouse. Pt denies nneds.    Destination      No service has been selected for the patient.      Durable Medical Equipment      No service has been selected for the patient.      Dialysis/Infusion      No service has been selected for the patient.      Home Medical Care      No service has been selected for the patient.      Therapy      No service has been selected for the patient.      Community Resources      No service has been selected for the patient.             Final Discharge Disposition Code: 01 - home or self-care

## 2019-07-05 NOTE — DISCHARGE SUMMARY
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 female who takes Fosamax and ibuprofen at home.  She presented with progressive weakness and palpitations.  Her hemoglobin was 5.  She had seen intermittent melanotic stool in the past couple weeks.     She was started empirically on IV protonix.  She was transfused two units and her hemoglobin increased appropriately to 7.5.  She got a dose of IV iron as well.  She underwent EGD revealing a large hiatal hernia and associated Mikey lesions.  Clinically, it's likely she has had slow chronic blood loss, but the reported melena indicates a possible acute source as well.    She will go home on Protonix and iron supplements.  She will have an outpatient colonoscopy, since her last was about 7 years ago.       Discharge Follow Up Recommendations for labs/diagnostics:   *repeat hgb at time of colonoscopy.     Day of Discharge     HPI:   Feels better.  No chest pain.  No  dizziness.     Review of Systems   Gen- No fevers, chills  CV- No chest pain, palpitations  Resp- No cough, dyspnea  GI- No N/V/D, abd pain    Otherwise ROS is negative except as mentioned in the HPI.    Vital Signs:   Temp:  [97.8 °F (36.6 °C)-98.3 °F (36.8 °C)] 97.8 °F (36.6 °C)  Heart Rate:  [77-85] 85  Resp:  [16-21] 18  BP: (117-144)/(67-87) 137/87     Physical Exam:  Constitutional: Awake, alert  Eyes: PERRLA, sclerae anicteric, no conjunctival injection  HENT: NCAT, mucous membranes moist  Neck: Supple, no thyromegaly, no lymphadenopathy, trachea midline  Respiratory: Clear to auscultation bilaterally, nonlabored respirations   Cardiovascular: RRR, no murmurs, rubs, or gallops, palpable pedal pulses bilaterally  Gastrointestinal: Positive bowel sounds, soft, nontender, nondistended  Musculoskeletal: No bilateral ankle edema, no clubbing or cyanosis to extremities  Psychiatric: Appropriate affect, cooperative  Neurologic: Oriented x 3, strength symmetric in all extremities, Cranial Nerves grossly intact to confrontation, speech clear  Skin: No rashes      Pertinent  and/or Most Recent Results     Results from last 7 days   Lab Units 07/05/19  0724 07/05/19  0431 07/04/19  1939 07/04/19  0758 07/04/19  0417 07/03/19 2013 07/03/19 2012   WBC 10*3/mm3  --   --   --   --   --  9.99  --    HEMOGLOBIN g/dL 7.5*  --  7.3* 7.0* 7.0* 5.0*  --    HEMATOCRIT % 25.8*  --  25.7* 23.9* 24.0* 18.2*  --    PLATELETS 10*3/mm3  --   --   --   --   --  423  --    SODIUM mmol/L  --  143  --   --  142  --  141   POTASSIUM mmol/L  --  4.5  --   --  4.7  --  3.8   CHLORIDE mmol/L  --  111*  --   --  111*  --  105   CO2 mmol/L  --  23.0  --   --  20.0*  --  22.0   BUN mg/dL  --  7*  --   --  13  --  12   CREATININE mg/dL  --  0.76  --   --  0.76  --  0.87   GLUCOSE mg/dL  --  99  --   --  100*  --  138*   CALCIUM mg/dL  --  8.3*  --   --  8.4*  --  8.9     Results from last 7 days   Lab Units 07/04/19  0417 07/03/19 2012    BILIRUBIN mg/dL  --  0.3   ALK PHOS U/L  --  39   ALT (SGPT) U/L  --  8   AST (SGOT) U/L  --  12   PROTIME Seconds 13.9  --    INR  1.12  --            Invalid input(s): TG, LDLCALC, LDLREALC        Brief Urine Lab Results  (Last result in the past 365 days)      Color   Clarity   Blood   Leuk Est   Nitrite   Protein   CREAT   Urine HCG        07/04/19 0220 Yellow Cloudy Negative Large (3+) Negative Negative               Microbiology Results Abnormal     None          Imaging Results (all)     None             Order Current Status    Hemoglobin A1C With EMG In process    Tissue Pathology Exam In process        Discharge Details        Discharge Medications      New Medications      Instructions Start Date   ferrous sulfate 325 (65 FE) MG tablet   325 mg, Oral, Daily With Breakfast      pantoprazole 40 MG EC tablet  Commonly known as:  PROTONIX   40 mg, Oral, Daily, Suppresses stomach acid for ulcer healing.      sennosides-docusate sodium 8.6-50 MG tablet  Commonly known as:  SENOKOT-S   1 tablet, Oral, Daily, Take with iron to prevent constipation.      vitamin C 500 MG tablet  Commonly known as:  ASCORBIC ACID   500 mg, Oral, Daily, Take with iron to help absorption.         Continue These Medications      Instructions Start Date   cholecalciferol 1000 units tablet  Commonly known as:  VITAMIN D3   2,000 Units, Oral, Daily      FOSAMAX PO   Oral, Daily             No Known Allergies      Discharge Disposition:  Home or Self Care    Discharge Diet:  Diet Order   Procedures   • Diet Regular; Consistent Carbohydrate         Discharge Activity:         CODE STATUS:    Code Status and Medical Interventions:   Ordered at: 07/04/19 0038     Level Of Support Discussed With:    Patient     Code Status:    CPR     Medical Interventions (Level of Support Prior to Arrest):    Full         Additional Instructions for the Follow-ups that You Need to Schedule     Discharge Follow-up with Specialty: Gastroenterology ([facility]) for colonoscopy.; 2 Weeks   As directed      Specialty:  Gastroenterology ([facility]) for colonoscopy.    Follow Up:  2 Weeks               Time Spent on Discharge:  40 minutes    Electronically signed by Glendy Schultz MD, 07/05/19, 10:58 AM.

## 2019-07-05 NOTE — ANESTHESIA POSTPROCEDURE EVALUATION
Patient: Zulema Jarquin    Procedure Summary     Date:  07/05/19 Room / Location:   [facility]    Anesthesia Start:  0910 Anesthesia Stop:      Procedure:  ESOPHAGOGASTRODUODENOSCOPY (N/A ) Diagnosis:       Acute blood loss anemia      Melena      (Acute blood loss anemia [D62])      (Melena [K92.1])    Surgeon:  B[facility], Mark I, MD Provider:  Nick Meléndez MD    Anesthesia Type:  MAC ASA Status:  2          Anesthesia Type: MAC  Last vitals  BP   124/83   Temp   97.8 F   Pulse   81   Resp   14   SpO2   100 %      Post Anesthesia Care and Evaluation    Patient location during evaluation: PACU  Patient participation: complete - patient cannot participate  Level of consciousness: lethargic  Pain score: 0  Pain management: adequate  Airway patency: patent  Anesthetic complications: No anesthetic complications  PONV Status: none  Cardiovascular status: acceptable  Respiratory status: acceptable  Hydration status: acceptable

## 2019-07-05 NOTE — PLAN OF CARE
Problem: Pain, Chronic (Adult)  Goal: Acceptable Pain/Comfort Level and Functional Ability  Outcome: Ongoing (interventions implemented as appropriate)   07/05/19 2525   Pain, Chronic (Adult)   Acceptable Pain/Comfort Level and Functional Ability making progress toward outcome

## 2019-07-05 NOTE — BRIEF OP NOTE
ESOPHAGOGASTRODUODENOSCOPY  Progress Note    Zulema Jarquin  7/3/2019 - 7/5/2019    EGD shows a medium sized hiatal hernia, with associated Mikey ulcers.  There is erosive gastritis in the antrum.  Duodenum normal.  Findings explain chronic blood loss, but not melena.    >> Await H. pylori biopsy.  >> Recommend daily PPI  >> Discontinue NSAIDs.  >> Recommend outpatient colonoscopy.    Mark I. [facility], MD     Date: 7/5/2019  Time: 9:26 AM

## 2019-07-06 LAB
CYTO UR: NORMAL
EST. AVERAGE GLUCOSE BLD GHB EST-MCNC: 103 MG/DL
HBA1C MFR BLD: 5.2 % (ref 4.8–5.6)
LAB AP CASE REPORT: NORMAL
LAB AP CLINICAL INFORMATION: NORMAL
PATH REPORT.FINAL DX SPEC: NORMAL
PATH REPORT.GROSS SPEC: NORMAL

## 2019-07-15 DIAGNOSIS — D62 ACUTE BLOOD LOSS ANEMIA: Primary | ICD-10-CM

## 2019-07-15 RX ORDER — SODIUM, POTASSIUM,MAG SULFATES 17.5-3.13G
SOLUTION, RECONSTITUTED, ORAL ORAL
Qty: 2 BOTTLE | Refills: 0 | Status: SHIPPED | OUTPATIENT
Start: 2019-07-15 | End: 2019-11-12

## 2019-07-22 ENCOUNTER — OUTSIDE FACILITY SERVICE (OUTPATIENT)
Dept: GASTROENTEROLOGY | Facility: CLINIC | Age: 64
End: 2019-07-22

## 2019-07-22 PROCEDURE — 45378 DIAGNOSTIC COLONOSCOPY: CPT | Performed by: INTERNAL MEDICINE

## 2019-10-01 ENCOUNTER — HOSPITAL ENCOUNTER (OUTPATIENT)
Dept: ONCOLOGY | Facility: HOSPITAL | Age: 64
Setting detail: INFUSION SERIES
Discharge: HOME | End: 2019-10-01

## 2019-10-01 ENCOUNTER — CONSULT (OUTPATIENT)
Dept: ONCOLOGY | Facility: CLINIC | Age: 64
End: 2019-10-01

## 2019-10-01 VITALS
DIASTOLIC BLOOD PRESSURE: 79 MMHG | WEIGHT: 157 LBS | TEMPERATURE: 99.5 F | RESPIRATION RATE: 18 BRPM | HEIGHT: 60 IN | OXYGEN SATURATION: 100 % | HEART RATE: 95 BPM | SYSTOLIC BLOOD PRESSURE: 149 MMHG | BODY MASS INDEX: 30.82 KG/M2

## 2019-10-01 DIAGNOSIS — D62 ACUTE BLOOD LOSS ANEMIA: Primary | ICD-10-CM

## 2019-10-01 DIAGNOSIS — K90.9 IRON MALABSORPTION: ICD-10-CM

## 2019-10-01 DIAGNOSIS — E53.8 B12 DEFICIENCY: ICD-10-CM

## 2019-10-01 DIAGNOSIS — D50.9 IRON DEFICIENCY ANEMIA, UNSPECIFIED IRON DEFICIENCY ANEMIA TYPE: Primary | ICD-10-CM

## 2019-10-01 DIAGNOSIS — D50.9 IRON DEFICIENCY ANEMIA, UNSPECIFIED IRON DEFICIENCY ANEMIA TYPE: ICD-10-CM

## 2019-10-01 PROCEDURE — 96375 TX/PRO/DX INJ NEW DRUG ADDON: CPT

## 2019-10-01 PROCEDURE — 99205 OFFICE O/P NEW HI 60 MIN: CPT | Performed by: INTERNAL MEDICINE

## 2019-10-01 PROCEDURE — 96374 THER/PROPH/DIAG INJ IV PUSH: CPT

## 2019-10-01 PROCEDURE — 63710000001 DIPHENHYDRAMINE PER 50 MG: Performed by: INTERNAL MEDICINE

## 2019-10-01 PROCEDURE — 96372 THER/PROPH/DIAG INJ SC/IM: CPT

## 2019-10-01 PROCEDURE — 25010000002 CYANOCOBALAMIN PER 1000 MCG: Performed by: INTERNAL MEDICINE

## 2019-10-01 PROCEDURE — 25010000002 FERRIC CARBOXYMALTOSE 750 MG/15ML SOLUTION 15 ML VIAL: Performed by: INTERNAL MEDICINE

## 2019-10-01 RX ORDER — CYANOCOBALAMIN 1000 UG/ML
1000 INJECTION, SOLUTION INTRAMUSCULAR; SUBCUTANEOUS ONCE
OUTPATIENT
Start: 2019-12-03

## 2019-10-01 RX ORDER — FAMOTIDINE 10 MG/ML
20 INJECTION, SOLUTION INTRAVENOUS ONCE
Status: CANCELLED | OUTPATIENT
Start: 2019-10-08

## 2019-10-01 RX ORDER — SODIUM CHLORIDE 9 MG/ML
250 INJECTION, SOLUTION INTRAVENOUS ONCE
Status: COMPLETED | OUTPATIENT
Start: 2019-10-01 | End: 2019-10-01

## 2019-10-01 RX ORDER — FAMOTIDINE 10 MG/ML
20 INJECTION, SOLUTION INTRAVENOUS ONCE
Status: COMPLETED | OUTPATIENT
Start: 2019-10-01 | End: 2019-10-01

## 2019-10-01 RX ORDER — SODIUM CHLORIDE 9 MG/ML
250 INJECTION, SOLUTION INTRAVENOUS ONCE
Status: CANCELLED | OUTPATIENT
Start: 2019-10-01

## 2019-10-01 RX ORDER — CYANOCOBALAMIN 1000 UG/ML
1000 INJECTION, SOLUTION INTRAMUSCULAR; SUBCUTANEOUS ONCE
Status: COMPLETED | OUTPATIENT
Start: 2019-10-01 | End: 2019-10-01

## 2019-10-01 RX ORDER — SODIUM CHLORIDE 9 MG/ML
250 INJECTION, SOLUTION INTRAVENOUS ONCE
Status: CANCELLED | OUTPATIENT
Start: 2019-10-08

## 2019-10-01 RX ORDER — DIPHENHYDRAMINE HCL 25 MG
25 CAPSULE ORAL ONCE
Status: CANCELLED | OUTPATIENT
Start: 2019-10-08

## 2019-10-01 RX ORDER — FAMOTIDINE 10 MG/ML
20 INJECTION, SOLUTION INTRAVENOUS ONCE
Status: CANCELLED | OUTPATIENT
Start: 2019-10-01

## 2019-10-01 RX ORDER — DIPHENHYDRAMINE HCL 25 MG
25 CAPSULE ORAL ONCE
Status: COMPLETED | OUTPATIENT
Start: 2019-10-01 | End: 2019-10-01

## 2019-10-01 RX ORDER — LANOLIN ALCOHOL/MO/W.PET/CERES
1000 CREAM (GRAM) TOPICAL DAILY
COMMUNITY
End: 2019-11-12

## 2019-10-01 RX ORDER — CYANOCOBALAMIN 1000 UG/ML
1000 INJECTION, SOLUTION INTRAMUSCULAR; SUBCUTANEOUS ONCE
Status: CANCELLED | OUTPATIENT
Start: 2019-10-01

## 2019-10-01 RX ORDER — CYANOCOBALAMIN 1000 UG/ML
1000 INJECTION, SOLUTION INTRAMUSCULAR; SUBCUTANEOUS ONCE
Status: CANCELLED | OUTPATIENT
Start: 2019-10-08

## 2019-10-01 RX ORDER — DIPHENHYDRAMINE HCL 25 MG
25 CAPSULE ORAL ONCE
Status: CANCELLED | OUTPATIENT
Start: 2019-10-01

## 2019-10-01 RX ORDER — CYANOCOBALAMIN 1000 UG/ML
1000 INJECTION, SOLUTION INTRAMUSCULAR; SUBCUTANEOUS ONCE
Status: CANCELLED | OUTPATIENT
Start: 2019-11-05

## 2019-10-01 RX ADMIN — FAMOTIDINE 20 MG: 10 INJECTION, SOLUTION INTRAVENOUS at 15:02

## 2019-10-01 RX ADMIN — DIPHENHYDRAMINE HYDROCHLORIDE 25 MG: 25 CAPSULE ORAL at 15:00

## 2019-10-01 RX ADMIN — SODIUM CHLORIDE 250 ML: 9 INJECTION, SOLUTION INTRAVENOUS at 15:02

## 2019-10-01 RX ADMIN — CYANOCOBALAMIN 1000 MCG: 1000 INJECTION, SOLUTION INTRAMUSCULAR; SUBCUTANEOUS at 16:02

## 2019-10-01 RX ADMIN — FERRIC CARBOXYMALTOSE INJECTION 750 MG: 50 INJECTION, SOLUTION INTRAVENOUS at 15:18

## 2019-10-01 NOTE — PROGRESS NOTES
DATE OF CONSULTATION: 10/1/2019    REFERRING PHYSICIAN: Micha Hernandez MD    Dear Micha Conley MD  Thank you for asking for my medical advice on this patient. I saw her in the  [place] office on 10/1/2019    REASON FOR CONSULTATION: Severe microcytic anemia    HISTORY OF PRESENT ILLNESS: The patient is a very pleasant 63 y.o.  female   who was in her usual state of health until July 2019.  Patient presented with generalized fatigue and weakness as well as dark stools.  She had a blood work done that revealed hemoglobin of 5.  She was admitted to [facility].  Upper endoscopy done July 5, 2019 revealed erosive gastritis.  Patient was treated with IV iron 2 units of blood and discharged home.  She has been on oral iron replacement as well as vitamin B12 and folic acid.  Repeated blood work done 2 weeks ago revealed relapse anemia with hemoglobin 5.8.  She was referred to me for further recommendations.    SUBJECTIVE: When I saw the patient today she is here today with her .  She is complaining of fatigue.  She is any fever chills no night sweats.  Has been having leg cramps at night but is been having dark stools and constipation since she started taking oral iron.    Review of Systems   Constitutional: Negative for activity change, appetite change, chills, fatigue, fever and unexpected weight change.   HENT: Negative for hearing loss, mouth sores, nosebleeds, sore throat and trouble swallowing.    Eyes: Negative for visual disturbance.   Respiratory: Negative for cough, chest tightness, shortness of breath and wheezing.    Cardiovascular: Negative for chest pain, palpitations and leg swelling.   Gastrointestinal: Negative for abdominal distention, abdominal pain, blood in stool, constipation, diarrhea, nausea, rectal pain and vomiting.   Endocrine: Negative for cold intolerance and heat intolerance.   Genitourinary: Negative for difficulty urinating, dysuria, frequency and urgency.    Musculoskeletal: Negative for arthralgias, back pain, gait problem, joint swelling and myalgias.   Skin: Negative for rash.   Neurological: Negative for dizziness, tremors, syncope, weakness, light-headedness, numbness and headaches.   Hematological: Negative for adenopathy. Does not bruise/bleed easily.   Psychiatric/Behavioral: Negative for confusion, sleep disturbance and suicidal ideas. The patient is not nervous/anxious.        Past Medical History:   Diagnosis Date   • Anemia    • Arthritis    • Heart murmur    • Kidney stones    • Rheumatic fever        Social History     Socioeconomic History   • Marital status:      Spouse name: Not on file   • Number of children: Not on file   • Years of education: Not on file   • Highest education level: Not on file   Tobacco Use   • Smoking status: Never Smoker   • Smokeless tobacco: Never Used   Substance and Sexual Activity   • Alcohol use: Yes     Comment: rarely   • Drug use: No   • Sexual activity: Defer       History reviewed. No pertinent family history.    Past Surgical History:   Procedure Laterality Date   • CARPAL TUNNEL RELEASE Right 1999   • COLONOSCOPY  07/2019   • ENDOSCOPY N/A 7/5/2019    Procedure: ESOPHAGOGASTRODUODENOSCOPY;  Surgeon: Brunner, Mark I, MD;  Location: [facility] ENDOSCOPY;  Service: Gastroenterology   • TUBAL ABDOMINAL LIGATION  1983       No Known Allergies       Current Outpatient Medications:   •  Alendronate Sodium (FOSAMAX PO), Take  by mouth Daily., Disp: , Rfl:   •  cholecalciferol (VITAMIN D3) 1000 units tablet, Take 2,000 Units by mouth Daily., Disp: , Rfl:   •  ferrous sulfate 325 (65 FE) MG tablet, Take 1 tablet by mouth Daily With Breakfast., Disp: 30 tablet, Rfl: 5  •  pantoprazole (PROTONIX) 40 MG EC tablet, Take 1 tablet by mouth Daily. Suppresses stomach acid for ulcer healing., Disp: 30 tablet, Rfl: 11  •  sennosides-docusate sodium (SENOKOT-S) 8.6-50 MG tablet, Take 1 tablet by mouth Daily. Take with iron to prevent  "constipation., Disp: 30 tablet, Rfl: 5  •  vitamin B-12 (CYANOCOBALAMIN) 1000 MCG tablet, Take 1,000 mcg by mouth Daily., Disp: , Rfl:   •  vitamin C (ASCORBIC ACID) 500 MG tablet, Take 1 tablet by mouth Daily. Take with iron to help absorption., Disp: 30 tablet, Rfl: 5  •  sodium-potassium-magnesium sulfates (SUPREP BOWEL PREP KIT) 17.5-3.13-1.6 GM/177ML solution oral solution, MUST HAVE . DON'T EAT DAY BEFORE APPT. READ INSTRUCTIONS MAILED TO YOU 7 DAYS BEFORE APPT. DIDN'T GET INSTRUCTIONS? CALL [phone]., Disp: 2 bottle, Rfl: 0    PHYSICAL EXAMINATION:   /79 Comment: LUE  Pulse 95   Temp 99.5 °F (37.5 °C) (Temporal)   Resp 18   Ht 152.4 cm (60\")   Wt 71.2 kg (157 lb)   SpO2 100% Comment: RA  BMI 30.66 kg/m²    ECOG Performance Status: 1 - Symptomatic but completely ambulatory  General Appearance:  alert, cooperative, no apparent distress and appears stated age   Neurologic/Psychiatric: A&O x 3, gait steady, appropriate affect, strength 5/5 in all muscle groups   HEENT:  Normocephalic, without obvious abnormality, mucous membranes moist   Neck: Supple, symmetrical, trachea midline, no adenopathy;  No thyromegaly, masses, or tenderness   Lungs:   Clear to auscultation bilaterally; respirations regular, even, and unlabored bilaterally   Heart:  Regular rate and rhythm, no murmurs appreciated   Abdomen:   Soft, non-tender, non-distended and no organomegaly   Lymph nodes: No cervical, supraclavicular, inguinal or axillary adenopathy noted   Extremities: Normal, atraumatic; no clubbing, cyanosis, or edema    Skin: No rashes, ulcers, or suspicious lesions noted       No visits with results within 2 Week(s) from this visit.   Latest known visit with results is:   Admission on 07/03/2019, Discharged on 07/05/2019   Component Date Value Ref Range Status   • ABO Type 07/03/2019 O   Final   • RH type 07/03/2019 Positive   Final   • Antibody Screen 07/03/2019 Negative   Final   • T&S Expiration Date " 07/03/2019 7/6/2019 11:59:59 PM   Final   • Fecal Occult Blood 07/03/2019 Positive* Negative Final   • Lot Number 07/03/2019 [id number]   Final   • Expiration Date 07/03/2019 9/21   Final   • DEVELOPER LOT NUMBER 07/03/2019 [id number]   Final   • DEVELOPER EXPIRATION DATE 07/03/2019 2,022/6   Final   • Positive Control 07/03/2019 Positive  Positive Final   • Negative Control 07/03/2019 Negative  Negative Final   • Extra Tube 07/03/2019 hold for add-on   Final    Auto resulted   • Extra Tube 07/03/2019 Hold for add-ons.   Final    Auto resulted.   • Extra Tube 07/03/2019 hold for add-on   Final    Auto resulted   • Extra Tube 07/03/2019 Hold for add-ons.   Final    Auto resulted.   • Glucose 07/03/2019 138* 65 - 99 mg/dL Final   • BUN 07/03/2019 12  8 - 23 mg/dL Final   • Creatinine 07/03/2019 0.87  0.57 - 1.00 mg/dL Final   • Sodium 07/03/2019 141  136 - 145 mmol/L Final   • Potassium 07/03/2019 3.8  3.5 - 5.2 mmol/L Final   • Chloride 07/03/2019 105  98 - 107 mmol/L Final   • CO2 07/03/2019 22.0  22.0 - 29.0 mmol/L Final   • Calcium 07/03/2019 8.9  8.6 - 10.5 mg/dL Final   • Total Protein 07/03/2019 6.4  6.0 - 8.5 g/dL Final   • Albumin 07/03/2019 4.00  3.50 - 5.20 g/dL Final   • ALT (SGPT) 07/03/2019 8  1 - 33 U/L Final   • AST (SGOT) 07/03/2019 12  1 - 32 U/L Final   • Alkaline Phosphatase 07/03/2019 39  39 - 117 U/L Final   • Total Bilirubin 07/03/2019 0.3  0.2 - 1.2 mg/dL Final   • eGFR Non African Amer 07/03/2019 65  >60 mL/min/1.73 Final   • Globulin 07/03/2019 2.4  gm/dL Final   • A/G Ratio 07/03/2019 1.7  g/dL Final   • BUN/Creatinine Ratio 07/03/2019 13.8  7.0 - 25.0 Final   • Anion Gap 07/03/2019 14.0  5.0 - 15.0 mmol/L Final   • Color, UA 07/04/2019 Yellow  Yellow, Straw Final   • Appearance,  07/04/2019 Cloudy* Clear Final   • pH,  07/04/2019 7.5  5.0 - 8.0 Final   • Specific Gravity,  07/04/2019 1.019  1.001 - 1.030 Final   • Glucose, UA 07/04/2019 Negative  Negative Final   • Ketones,  07/04/2019  Negative  Negative Final   • Bilirubin, UA 07/04/2019 Negative  Negative Final   • Blood, UA 07/04/2019 Negative  Negative Final   • Protein, UA 07/04/2019 Negative  Negative Final   • Leuk Esterase, UA 07/04/2019 Large (3+)* Negative Final   • Nitrite, UA 07/04/2019 Negative  Negative Final   • Urobilinogen, UA 07/04/2019 1.0 E.U./dL  0.2 - 1.0 E.U./dL Final   • WBC 07/03/2019 9.99  3.40 - 10.80 10*3/mm3 Final   • RBC 07/03/2019 2.34* 3.77 - 5.28 10*6/mm3 Final   • Hemoglobin 07/03/2019 5.0* 12.0 - 15.9 g/dL Final   • Hematocrit 07/03/2019 18.2* 34.0 - 46.6 % Final   • MCV 07/03/2019 77.8* 79.0 - 97.0 fL Final   • MCH 07/03/2019 21.4* 26.6 - 33.0 pg Final   • MCHC 07/03/2019 27.5* 31.5 - 35.7 g/dL Final   • RDW 07/03/2019 16.9* 12.3 - 15.4 % Final   • RDW-SD 07/03/2019 47.6  37.0 - 54.0 fl Final   • MPV 07/03/2019 9.7  6.0 - 12.0 fL Final   • Platelets 07/03/2019 423  140 - 450 10*3/mm3 Final   • Neutrophil % 07/03/2019 72.7  42.7 - 76.0 % Final   • Lymphocyte % 07/03/2019 19.8  19.6 - 45.3 % Final   • Monocyte % 07/03/2019 5.9  5.0 - 12.0 % Final   • Eosinophil % 07/03/2019 0.5  0.3 - 6.2 % Final   • Basophil % 07/03/2019 0.5  0.0 - 1.5 % Final   • Immature Grans % 07/03/2019 0.6* 0.0 - 0.5 % Final   • Neutrophils, Absolute 07/03/2019 7.26* 1.70 - 7.00 10*3/mm3 Final   • Lymphocytes, Absolute 07/03/2019 1.98  0.70 - 3.10 10*3/mm3 Final   • Monocytes, Absolute 07/03/2019 0.59  0.10 - 0.90 10*3/mm3 Final   • Eosinophils, Absolute 07/03/2019 0.05  0.00 - 0.40 10*3/mm3 Final   • Basophils, Absolute 07/03/2019 0.05  0.00 - 0.20 10*3/mm3 Final   • Immature Grans, Absolute 07/03/2019 0.06* 0.00 - 0.05 10*3/mm3 Final   • nRBC 07/03/2019 0.4* 0.0 - 0.2 /100 WBC Final   • Hypochromia 07/03/2019 Slight/1+  None Seen Final   • WBC Morphology 07/03/2019 Normal  Normal Final   • Platelet Morphology 07/03/2019 Normal  Normal Final   • ABO Type 07/03/2019 O   Final   • RH type 07/03/2019 Positive   Final   • Product Code  07/05/2019 [id number]   Final   • Unit Number 07/05/2019 [id number]   Final   • UNIT  ABO 07/05/2019 O   Final   • UNIT  RH 07/05/2019 POS   Final   • Dispense Status 07/05/2019 PT   Final   • Blood Type 07/05/2019 OPOS   Final   • Blood Expiration Date 07/05/2019 201908012359   Final   • Blood Type Barcode 07/05/2019 5100   Final   • Product Code 07/05/2019 [id number]   Final   • Unit Number 07/05/2019 [id number]   Final   • UNIT  ABO 07/05/2019 O   Final   • UNIT  RH 07/05/2019 POS   Final   • Dispense Status 07/05/2019 PT   Final   • Blood Type 07/05/2019 OPOS   Final   • Blood Expiration Date 07/05/2019 201908012359   Final   • Blood Type Barcode 07/05/2019 5100   Final   • Hemoglobin 07/04/2019 7.0* 12.0 - 15.9 g/dL Final   • Hematocrit 07/04/2019 24.0* 34.0 - 46.6 % Final   • Glucose 07/04/2019 100* 65 - 99 mg/dL Final   • BUN 07/04/2019 13  8 - 23 mg/dL Final   • Creatinine 07/04/2019 0.76  0.57 - 1.00 mg/dL Final   • Sodium 07/04/2019 142  136 - 145 mmol/L Final   • Potassium 07/04/2019 4.7  3.5 - 5.2 mmol/L Final   • Chloride 07/04/2019 111* 98 - 107 mmol/L Final   • CO2 07/04/2019 20.0* 22.0 - 29.0 mmol/L Final   • Calcium 07/04/2019 8.4* 8.6 - 10.5 mg/dL Final   • eGFR Non African Amer 07/04/2019 76  >60 mL/min/1.73 Final   • BUN/Creatinine Ratio 07/04/2019 17.1  7.0 - 25.0 Final   • Anion Gap 07/04/2019 11.0  5.0 - 15.0 mmol/L Final   • Protime 07/04/2019 13.9  11.2 - 14.3 Seconds Final   • INR 07/04/2019 1.12  0.85 - 1.16 Final   • Ferritin 07/03/2019 4.44* 13.00 - 150.00 ng/mL Final   • Iron 07/03/2019 13* 37 - 145 mcg/dL Final   • Iron Saturation 07/03/2019 3* 20 - 50 % Final   • Transferrin 07/03/2019 327  200 - 360 mg/dL Final   • TIBC 07/03/2019 487  298 - 536 mcg/dL Final   • Reticulocyte % 07/03/2019 3.65* 0.70 - 1.90 % Final   • Reticulocyte Absolute 07/03/2019 0.0854  0.0200 - 0.1300 10*6/mm3 Final   • Vitamin B-12 07/04/2019 261  211 - 946 pg/mL Final   • Folate 07/04/2019 9.95  4.78 -  24.20 ng/mL Final   • Hemoglobin 07/04/2019 7.0* 12.0 - 15.9 g/dL Final   • Hematocrit 07/04/2019 23.9* 34.0 - 46.6 % Final   • RBC, UA 07/04/2019 0-2  None Seen, 0-2 /HPF Final   • WBC, UA 07/04/2019 21-30* None Seen, 0-2 /HPF Final   • Bacteria, UA 07/04/2019 None Seen  None Seen, Trace /HPF Final   • Squamous Epithelial Cells, UA 07/04/2019 0-2  None Seen, 0-2 /HPF Final   • Hyaline Casts, UA 07/04/2019 0-6  0 - 6 /LPF Final   • Methodology 07/04/2019 Automated Microscopy   Final   • Hemoglobin A1C 07/04/2019 5.2  4.8 - 5.6 % Final             Prediabetes: 5.7 - 6.4           Diabetes: >6.4           Glycemic control for adults with diabetes: <7.0   • Mean Bld Glu Estim. 07/04/2019 103  mg/dL Final   • Hemoglobin 07/04/2019 7.3* 12.0 - 15.9 g/dL Final   • Hematocrit 07/04/2019 25.7* 34.0 - 46.6 % Final   • Glucose 07/05/2019 99  65 - 99 mg/dL Final   • BUN 07/05/2019 7* 8 - 23 mg/dL Final   • Creatinine 07/05/2019 0.76  0.57 - 1.00 mg/dL Final   • Sodium 07/05/2019 143  136 - 145 mmol/L Final   • Potassium 07/05/2019 4.5  3.5 - 5.2 mmol/L Final   • Chloride 07/05/2019 111* 98 - 107 mmol/L Final   • CO2 07/05/2019 23.0  22.0 - 29.0 mmol/L Final   • Calcium 07/05/2019 8.3* 8.6 - 10.5 mg/dL Final   • eGFR Non African Amer 07/05/2019 76  >60 mL/min/1.73 Final   • BUN/Creatinine Ratio 07/05/2019 9.2  7.0 - 25.0 Final   • Anion Gap 07/05/2019 9.0  5.0 - 15.0 mmol/L Final   • Hemoglobin 07/05/2019 7.5* 12.0 - 15.9 g/dL Final   • Hematocrit 07/05/2019 25.8* 34.0 - 46.6 % Final   • Case Report 07/05/2019    Final                    Value:Surgical Pathology Report                         Case: [id number]                                  Authorizing Provider:  Brunner, Mark I, MD        Collected:           07/05/2019 09:24 AM          Ordering Location:     [facility]   Received:            07/05/2019 10:31 AM                                 [facility]                                                                   Pathologist:           Dimas Norwood MD                                                        Specimen:    Gastric, Antrum, ANTRUM BX                                                                • Clinical Information 07/05/2019    Final                    Value:This result contains rich text formatting which cannot be displayed here.   • Final Diagnosis 07/05/2019    Final                    Value:This result contains rich text formatting which cannot be displayed here.   • Gross Description 07/05/2019    Final                    Value:This result contains rich text formatting which cannot be displayed here.   • Microscopic Description 07/05/2019    Final                    Value:This result contains rich text formatting which cannot be displayed here.        No results found.      DIAGNOSTIC DATA:   1. Radiology: None available  2. Dr. Hernandez's note from September 12, 2019 reviewed by me and documented in the  chart.   3. Pathology report:   Final Diagnosis   GASTRIC ANTRUM BIOPSY:  Minimal chronic gastritis without activity with focal features suggestive of possible surface erosion.  Negative for intestinal metaplasia and dysplasia.  No Helicobacter pylori-like organisms identified on routine stains.     [facility]    Electronically signed by Dimas Norwood MD on 7/6/2019 at 0755       4. Laboratory data: As above    ASSESSMENT: The patient is a very pleasant 63 y.o.  female  with severe anemia    PROBLEM LIST:   1.  Severe anemia:  A.  Diagnosed July 2019  B.  Induced by chronic GI blood loss  2.  Severe iron deficiency:  A.  EGD done July 5, 2019 revealed erosive gastritis  B.  Colonoscopy done July 12, 2019 no abnormalities noted  3.  Microcytosis  4.  Vitamin B12 deficiency  5.  Erosive gastritis:  A.  On Protonix 40 mg daily    PLAN:   1. I had a long discussion today with the patient and her  about her  new diagnosis of anemia. I did go over the final pathology report in  detail with  both of them. I reviewed the patient's documents including refereing provider's notes, lab results, imaging, and path report.   2.  The patient has failed oral treatment.  I asked her to hold iron as well as vitamin C at this point.  3.  The patient will be treated with 2 dose of IV iron using Injectafer 750 mg 1 week apart.  4.  The patient was advised about potential side effects of IV iron including anaphylaxis.  She will be pretreated with Benadryl and Pepcid.  5.  The patient will be treated with vitamin B12 1000 mcg IM day 1 day 8 then monthly thereafter.  6.  We will continue Protonix daily for erosive gastritis.  7.  The patient follow-up with me in 6 weeks with repeat CBC and iron studies.  8. We will consider repeating GI work-up for iron deficiency relapses.  Raghav Burgos MD  10/1/2019

## 2019-10-08 ENCOUNTER — HOSPITAL ENCOUNTER (OUTPATIENT)
Dept: ONCOLOGY | Facility: HOSPITAL | Age: 64
Setting detail: INFUSION SERIES
Discharge: HOME | End: 2019-10-08

## 2019-10-08 VITALS
BODY MASS INDEX: 30.63 KG/M2 | WEIGHT: 156 LBS | HEIGHT: 60 IN | DIASTOLIC BLOOD PRESSURE: 70 MMHG | TEMPERATURE: 98.2 F | SYSTOLIC BLOOD PRESSURE: 144 MMHG | HEART RATE: 77 BPM | RESPIRATION RATE: 16 BRPM

## 2019-10-08 DIAGNOSIS — D62 ACUTE BLOOD LOSS ANEMIA: Primary | ICD-10-CM

## 2019-10-08 DIAGNOSIS — K90.9 IRON MALABSORPTION: ICD-10-CM

## 2019-10-08 DIAGNOSIS — D50.9 IRON DEFICIENCY ANEMIA, UNSPECIFIED IRON DEFICIENCY ANEMIA TYPE: ICD-10-CM

## 2019-10-08 DIAGNOSIS — E53.8 B12 DEFICIENCY: ICD-10-CM

## 2019-10-08 LAB
ERYTHROCYTE [DISTWIDTH] IN BLOOD BY AUTOMATED COUNT: 21 % (ref 12.3–15.4)
FERRITIN SERPL-MCNC: 381.2 NG/ML (ref 13–150)
FOLATE SERPL-MCNC: 17 NG/ML (ref 4.78–24.2)
HCT VFR BLD AUTO: 26.3 % (ref 34–46.6)
HGB BLD-MCNC: 7.4 G/DL (ref 12–15.9)
IRON 24H UR-MRATE: 46 MCG/DL (ref 37–145)
IRON SATN MFR SERPL: 11 % (ref 20–50)
LYMPHOCYTES # BLD AUTO: 1.1 10*3/MM3 (ref 0.7–3.1)
LYMPHOCYTES NFR BLD AUTO: 19.8 % (ref 19.6–45.3)
MCH RBC QN AUTO: 21.1 PG (ref 26.6–33)
MCHC RBC AUTO-ENTMCNC: 28.2 G/DL (ref 31.5–35.7)
MCV RBC AUTO: 74.8 FL (ref 79–97)
MONOCYTES # BLD AUTO: 0.2 10*3/MM3 (ref 0.1–0.9)
MONOCYTES NFR BLD AUTO: 2.8 % (ref 5–12)
NEUTROPHILS # BLD AUTO: 4.4 10*3/MM3 (ref 1.7–7)
NEUTROPHILS NFR BLD AUTO: 77.4 % (ref 42.7–76)
PLATELET # BLD AUTO: 320 10*3/MM3 (ref 140–450)
PMV BLD AUTO: 8.2 FL (ref 6–12)
RBC # BLD AUTO: 3.52 10*6/MM3 (ref 3.77–5.28)
TIBC SERPL-MCNC: 417 MCG/DL (ref 298–536)
TRANSFERRIN SERPL-MCNC: 280 MG/DL (ref 200–360)
VIT B12 BLD-MCNC: 892 PG/ML (ref 211–946)
WBC NRBC COR # BLD: 5.7 10*3/MM3 (ref 3.4–10.8)

## 2019-10-08 PROCEDURE — 96372 THER/PROPH/DIAG INJ SC/IM: CPT

## 2019-10-08 PROCEDURE — 82746 ASSAY OF FOLIC ACID SERUM: CPT | Performed by: INTERNAL MEDICINE

## 2019-10-08 PROCEDURE — 82607 VITAMIN B-12: CPT | Performed by: INTERNAL MEDICINE

## 2019-10-08 PROCEDURE — 96360 HYDRATION IV INFUSION INIT: CPT

## 2019-10-08 PROCEDURE — 36415 COLL VENOUS BLD VENIPUNCTURE: CPT

## 2019-10-08 PROCEDURE — 25010000002 FERRIC CARBOXYMALTOSE 750 MG/15ML SOLUTION 15 ML VIAL: Performed by: INTERNAL MEDICINE

## 2019-10-08 PROCEDURE — 96375 TX/PRO/DX INJ NEW DRUG ADDON: CPT

## 2019-10-08 PROCEDURE — 85025 COMPLETE CBC W/AUTO DIFF WBC: CPT | Performed by: INTERNAL MEDICINE

## 2019-10-08 PROCEDURE — 25010000002 CYANOCOBALAMIN PER 1000 MCG: Performed by: INTERNAL MEDICINE

## 2019-10-08 PROCEDURE — 83540 ASSAY OF IRON: CPT | Performed by: INTERNAL MEDICINE

## 2019-10-08 PROCEDURE — 82728 ASSAY OF FERRITIN: CPT | Performed by: INTERNAL MEDICINE

## 2019-10-08 PROCEDURE — 96374 THER/PROPH/DIAG INJ IV PUSH: CPT

## 2019-10-08 PROCEDURE — 84466 ASSAY OF TRANSFERRIN: CPT | Performed by: INTERNAL MEDICINE

## 2019-10-08 PROCEDURE — 63710000001 DIPHENHYDRAMINE PER 50 MG: Performed by: INTERNAL MEDICINE

## 2019-10-08 RX ORDER — SODIUM CHLORIDE 9 MG/ML
250 INJECTION, SOLUTION INTRAVENOUS ONCE
Status: COMPLETED | OUTPATIENT
Start: 2019-10-08 | End: 2019-10-08

## 2019-10-08 RX ORDER — DIPHENHYDRAMINE HCL 25 MG
25 CAPSULE ORAL ONCE
Status: COMPLETED | OUTPATIENT
Start: 2019-10-08 | End: 2019-10-08

## 2019-10-08 RX ORDER — CYANOCOBALAMIN 1000 UG/ML
1000 INJECTION, SOLUTION INTRAMUSCULAR; SUBCUTANEOUS ONCE
Status: COMPLETED | OUTPATIENT
Start: 2019-10-08 | End: 2019-10-08

## 2019-10-08 RX ORDER — FAMOTIDINE 10 MG/ML
20 INJECTION, SOLUTION INTRAVENOUS ONCE
Status: COMPLETED | OUTPATIENT
Start: 2019-10-08 | End: 2019-10-08

## 2019-10-08 RX ADMIN — SODIUM CHLORIDE 250 ML: 9 INJECTION, SOLUTION INTRAVENOUS at 10:50

## 2019-10-08 RX ADMIN — FERRIC CARBOXYMALTOSE INJECTION 750 MG: 50 INJECTION, SOLUTION INTRAVENOUS at 11:10

## 2019-10-08 RX ADMIN — FAMOTIDINE 20 MG: 10 INJECTION, SOLUTION INTRAVENOUS at 10:50

## 2019-10-08 RX ADMIN — DIPHENHYDRAMINE HYDROCHLORIDE 25 MG: 25 CAPSULE ORAL at 10:50

## 2019-10-08 RX ADMIN — CYANOCOBALAMIN 1000 MCG: 1000 INJECTION, SOLUTION INTRAMUSCULAR; SUBCUTANEOUS at 11:35

## 2019-11-05 ENCOUNTER — HOSPITAL ENCOUNTER (OUTPATIENT)
Dept: ONCOLOGY | Facility: HOSPITAL | Age: 64
Setting detail: INFUSION SERIES
Discharge: HOME | End: 2019-11-05

## 2019-11-05 ENCOUNTER — TRANSCRIBE ORDERS (OUTPATIENT)
Dept: ADMINISTRATIVE | Facility: HOSPITAL | Age: 64
End: 2019-11-05

## 2019-11-05 VITALS
WEIGHT: 158 LBS | DIASTOLIC BLOOD PRESSURE: 77 MMHG | BODY MASS INDEX: 31.02 KG/M2 | HEART RATE: 87 BPM | RESPIRATION RATE: 20 BRPM | HEIGHT: 60 IN | TEMPERATURE: 98.3 F | SYSTOLIC BLOOD PRESSURE: 156 MMHG

## 2019-11-05 DIAGNOSIS — Z12.31 VISIT FOR SCREENING MAMMOGRAM: Primary | ICD-10-CM

## 2019-11-05 DIAGNOSIS — E53.8 B12 DEFICIENCY: Primary | ICD-10-CM

## 2019-11-05 PROCEDURE — 25010000002 CYANOCOBALAMIN PER 1000 MCG: Performed by: INTERNAL MEDICINE

## 2019-11-05 PROCEDURE — 96372 THER/PROPH/DIAG INJ SC/IM: CPT

## 2019-11-05 RX ORDER — CYANOCOBALAMIN 1000 UG/ML
1000 INJECTION, SOLUTION INTRAMUSCULAR; SUBCUTANEOUS ONCE
Status: COMPLETED | OUTPATIENT
Start: 2019-11-05 | End: 2019-11-05

## 2019-11-05 RX ADMIN — CYANOCOBALAMIN 1000 MCG: 1000 INJECTION, SOLUTION INTRAMUSCULAR; SUBCUTANEOUS at 10:34

## 2019-11-11 DIAGNOSIS — K90.9 IRON MALABSORPTION: Primary | ICD-10-CM

## 2019-11-12 ENCOUNTER — OFFICE VISIT (OUTPATIENT)
Dept: ONCOLOGY | Facility: CLINIC | Age: 64
End: 2019-11-12

## 2019-11-12 ENCOUNTER — LAB (OUTPATIENT)
Dept: LAB | Facility: HOSPITAL | Age: 64
End: 2019-11-12

## 2019-11-12 VITALS
OXYGEN SATURATION: 99 % | TEMPERATURE: 98.4 F | SYSTOLIC BLOOD PRESSURE: 183 MMHG | RESPIRATION RATE: 12 BRPM | DIASTOLIC BLOOD PRESSURE: 96 MMHG | HEIGHT: 60 IN | HEART RATE: 71 BPM | BODY MASS INDEX: 31.02 KG/M2 | WEIGHT: 158 LBS

## 2019-11-12 DIAGNOSIS — D50.0 IRON DEFICIENCY ANEMIA DUE TO CHRONIC BLOOD LOSS: Primary | ICD-10-CM

## 2019-11-12 DIAGNOSIS — K90.9 IRON MALABSORPTION: ICD-10-CM

## 2019-11-12 LAB
ERYTHROCYTE [DISTWIDTH] IN BLOOD BY AUTOMATED COUNT: 29.8 % (ref 12.3–15.4)
FERRITIN SERPL-MCNC: 110.5 NG/ML (ref 13–150)
HCT VFR BLD AUTO: 39.4 % (ref 34–46.6)
HGB BLD-MCNC: 12.4 G/DL (ref 12–15.9)
IRON 24H UR-MRATE: 60 MCG/DL (ref 37–145)
IRON SATN MFR SERPL: 16 % (ref 20–50)
LYMPHOCYTES # BLD AUTO: 1.7 10*3/MM3 (ref 0.7–3.1)
LYMPHOCYTES NFR BLD AUTO: 29.2 % (ref 19.6–45.3)
MCH RBC QN AUTO: 29.1 PG (ref 26.6–33)
MCHC RBC AUTO-ENTMCNC: 31.5 G/DL (ref 31.5–35.7)
MCV RBC AUTO: 92.4 FL (ref 79–97)
MONOCYTES # BLD AUTO: 0.6 10*3/MM3 (ref 0.1–0.9)
MONOCYTES NFR BLD AUTO: 9.7 % (ref 5–12)
NEUTROPHILS # BLD AUTO: 3.6 10*3/MM3 (ref 1.7–7)
NEUTROPHILS NFR BLD AUTO: 61.1 % (ref 42.7–76)
PLATELET # BLD AUTO: 270 10*3/MM3 (ref 140–450)
PMV BLD AUTO: 7.4 FL (ref 6–12)
RBC # BLD AUTO: 4.26 10*6/MM3 (ref 3.77–5.28)
TIBC SERPL-MCNC: 375 MCG/DL (ref 298–536)
TRANSFERRIN SERPL-MCNC: 252 MG/DL (ref 200–360)
WBC NRBC COR # BLD: 5.9 10*3/MM3 (ref 3.4–10.8)

## 2019-11-12 PROCEDURE — 36415 COLL VENOUS BLD VENIPUNCTURE: CPT

## 2019-11-12 PROCEDURE — 85025 COMPLETE CBC W/AUTO DIFF WBC: CPT

## 2019-11-12 PROCEDURE — 84466 ASSAY OF TRANSFERRIN: CPT

## 2019-11-12 PROCEDURE — 99214 OFFICE O/P EST MOD 30 MIN: CPT | Performed by: INTERNAL MEDICINE

## 2019-11-12 PROCEDURE — 83540 ASSAY OF IRON: CPT

## 2019-11-12 PROCEDURE — 82728 ASSAY OF FERRITIN: CPT

## 2019-11-12 RX ORDER — CYANOCOBALAMIN 1000 UG/ML
1000 INJECTION, SOLUTION INTRAMUSCULAR; SUBCUTANEOUS
Qty: 30 ML | Refills: 30 | Status: SHIPPED | OUTPATIENT
Start: 2019-11-12 | End: 2020-05-27 | Stop reason: SDUPTHER

## 2019-11-12 RX ORDER — MONTELUKAST SODIUM 10 MG/1
TABLET ORAL
COMMUNITY
Start: 2013-11-21 | End: 2019-11-12

## 2019-11-12 NOTE — PROGRESS NOTES
DATE OF VISIT: 11/12/2019    REASON FOR VISIT: Followup for iron deficiency anemia     HISTORY OF PRESENT ILLNESS: The patient is a very pleasant 64 y.o. female  with past medical history significant for iron deficiency anemia diagnosed September 2019.  Patient was treated with 2 dose of IV iron. The patient is here today for gradual follow-up visit.    SUBJECTIVE: The patient has been doing fairly well. she was able to tolerate  her treatment without any serious side effects. she denied any fever or  chills, no night sweats, denied any headaches.     PAST MEDICAL HISTORY/SOCIAL HISTORY/FAMILY HISTORY: Reviewed by me and unchanged from my documentation done on 11/12/19.    Review of Systems   Constitutional: Negative for activity change, appetite change, chills, fatigue, fever and unexpected weight change.   HENT: Negative for hearing loss, mouth sores, nosebleeds, sore throat and trouble swallowing.    Eyes: Negative for visual disturbance.   Respiratory: Negative for cough, chest tightness, shortness of breath and wheezing.    Cardiovascular: Negative for chest pain, palpitations and leg swelling.   Gastrointestinal: Negative for abdominal distention, abdominal pain, blood in stool, constipation, diarrhea, nausea, rectal pain and vomiting.   Endocrine: Negative for cold intolerance and heat intolerance.   Genitourinary: Negative for difficulty urinating, dysuria, frequency and urgency.   Musculoskeletal: Negative for arthralgias, back pain, gait problem, joint swelling and myalgias.   Skin: Negative for rash.   Neurological: Negative for dizziness, tremors, syncope, weakness, light-headedness, numbness and headaches.   Hematological: Negative for adenopathy. Does not bruise/bleed easily.   Psychiatric/Behavioral: Negative for confusion, sleep disturbance and suicidal ideas. The patient is not nervous/anxious.          Current Outpatient Medications:   •  cholecalciferol (VITAMIN D3) 25 MCG (1000 UT) tablet, Take  "1,000 Units by mouth Daily., Disp: , Rfl:   •  Alendronate Sodium (FOSAMAX PO), Take  by mouth Daily., Disp: , Rfl:   •  cyanocobalamin 1000 MCG/ML injection, Inject 1 mL into the appropriate muscle as directed by prescriber Every 28 (Twenty-Eight) Days., Disp: 30 mL, Rfl: 30  •  pantoprazole (PROTONIX) 40 MG EC tablet, Take 1 tablet by mouth Daily. Suppresses stomach acid for ulcer healing., Disp: 30 tablet, Rfl: 11    PHYSICAL EXAMINATION:   BP (!) 183/96   Pulse 71   Temp 98.4 °F (36.9 °C) (Temporal)   Resp 12   Ht 152.4 cm (60\")   Wt 71.7 kg (158 lb)   SpO2 99%   BMI 30.86 kg/m²    ECOG Performance Status: 1 - Symptomatic but completely ambulatory  General Appearance:  alert, cooperative, no apparent distress and appears stated age   Neurologic/Psychiatric: A&O x 3, gait steady, appropriate affect, strength 5/5 in all muscle groups   HEENT:  Normocephalic, without obvious abnormality, mucous membranes moist   Neck: Supple, symmetrical, trachea midline, no adenopathy;  No thyromegaly, masses, or tenderness   Lungs:   Clear to auscultation bilaterally; respirations regular, even, and unlabored bilaterally   Heart:  Regular rate and rhythm, no murmurs appreciated   Abdomen:   Soft, non-tender, non-distended and no organomegaly   Lymph nodes: No cervical, supraclavicular, inguinal or axillary adenopathy noted   Extremities: Normal, atraumatic; no clubbing, cyanosis, or edema    Skin: No rashes, ulcers, or suspicious lesions noted     Lab on 11/12/2019   Component Date Value Ref Range Status   • WBC 11/12/2019 5.90  3.40 - 10.80 10*3/mm3 Final   • RBC 11/12/2019 4.26  3.77 - 5.28 10*6/mm3 Final   • Hemoglobin 11/12/2019 12.4  12.0 - 15.9 g/dL Final   • Hematocrit 11/12/2019 39.4  34.0 - 46.6 % Final   • RDW 11/12/2019 29.8* 12.3 - 15.4 % Final   • MCV 11/12/2019 92.4  79.0 - 97.0 fL Final   • MCH 11/12/2019 29.1  26.6 - 33.0 pg Final   • MCHC 11/12/2019 31.5  31.5 - 35.7 g/dL Final   • MPV 11/12/2019 7.4  6.0 " - 12.0 fL Final   • Platelets 11/12/2019 270  140 - 450 10*3/mm3 Final   • Neutrophil % 11/12/2019 61.1  42.7 - 76.0 % Final   • Lymphocyte % 11/12/2019 29.2  19.6 - 45.3 % Final   • Monocyte % 11/12/2019 9.7  5.0 - 12.0 % Final   • Neutrophils, Absolute 11/12/2019 3.60  1.70 - 7.00 10*3/mm3 Final   • Lymphocytes, Absolute 11/12/2019 1.70  0.70 - 3.10 10*3/mm3 Final   • Monocytes, Absolute 11/12/2019 0.60  0.10 - 0.90 10*3/mm3 Final        No results found.    ASSESSMENT: The patient is a very pleasant 64 y.o. female  with severe anemia    PROBLEM LIST:   1.  Severe anemia:  A.  Diagnosed July 2019  B.  Induced by chronic GI blood loss  2.  Severe iron deficiency:  A.  EGD done July 5, 2019 revealed erosive gastritis  B.  Colonoscopy done July 12, 2019 no abnormalities noted  3.  Microcytosis  4.  Vitamin B12 deficiency  5.  Erosive gastritis:  A.  On Protonix 40 mg daily    PLAN:  1.  I did go over the blood work results with the patient I reassured her hemoglobin is back to normal it has improved from 7.4-12.4.  2.  I will follow-up on the iron studies to make sure patient iron storage is adequate.  3. We will consider another round of IV iron if her ferritin less than 30 or saturation less than 10%.  4.  The patient follow-up with us in 6-month repeat CBC and iron profile.  5.  We will continue vitamin B12 1000 mcg IM once a month.  I gave patient prescription to start doing this at home.  Raghav Burgos MD  11/12/2019

## 2019-12-03 ENCOUNTER — APPOINTMENT (OUTPATIENT)
Dept: ONCOLOGY | Facility: HOSPITAL | Age: 64
End: 2019-12-03

## 2020-01-15 ENCOUNTER — APPOINTMENT (OUTPATIENT)
Dept: MAMMOGRAPHY | Facility: HOSPITAL | Age: 65
End: 2020-01-15

## 2020-02-05 RX ORDER — NEEDLES, FILTER 19GX1 1/2"
NEEDLE, DISPOSABLE MISCELLANEOUS
Qty: 3 EACH | Refills: 0 | Status: SHIPPED | OUTPATIENT
Start: 2020-02-05 | End: 2020-05-27 | Stop reason: SDUPTHER

## 2020-05-13 ENCOUNTER — LAB (OUTPATIENT)
Dept: LAB | Facility: HOSPITAL | Age: 65
End: 2020-05-13

## 2020-05-13 ENCOUNTER — OFFICE VISIT (OUTPATIENT)
Dept: ONCOLOGY | Facility: CLINIC | Age: 65
End: 2020-05-13

## 2020-05-13 VITALS
DIASTOLIC BLOOD PRESSURE: 96 MMHG | SYSTOLIC BLOOD PRESSURE: 177 MMHG | HEIGHT: 61 IN | TEMPERATURE: 99.3 F | BODY MASS INDEX: 31.66 KG/M2 | HEART RATE: 91 BPM | WEIGHT: 167.7 LBS | OXYGEN SATURATION: 99 % | RESPIRATION RATE: 20 BRPM

## 2020-05-13 DIAGNOSIS — D50.0 IRON DEFICIENCY ANEMIA DUE TO CHRONIC BLOOD LOSS: ICD-10-CM

## 2020-05-13 DIAGNOSIS — K90.9 IRON MALABSORPTION: ICD-10-CM

## 2020-05-13 DIAGNOSIS — D64.9 SYMPTOMATIC ANEMIA: Primary | ICD-10-CM

## 2020-05-13 DIAGNOSIS — D50.9 IRON DEFICIENCY ANEMIA, UNSPECIFIED IRON DEFICIENCY ANEMIA TYPE: ICD-10-CM

## 2020-05-13 LAB
ERYTHROCYTE [DISTWIDTH] IN BLOOD BY AUTOMATED COUNT: 16.6 % (ref 12.3–15.4)
FERRITIN SERPL-MCNC: 9.38 NG/ML (ref 13–150)
HCT VFR BLD AUTO: 40.1 % (ref 34–46.6)
HGB BLD-MCNC: 11.9 G/DL (ref 12–15.9)
IRON 24H UR-MRATE: 32 MCG/DL (ref 37–145)
IRON SATN MFR SERPL: 7 % (ref 20–50)
LYMPHOCYTES # BLD AUTO: 1.7 10*3/MM3 (ref 0.7–3.1)
LYMPHOCYTES NFR BLD AUTO: 28.4 % (ref 19.6–45.3)
MCH RBC QN AUTO: 24.1 PG (ref 26.6–33)
MCHC RBC AUTO-ENTMCNC: 29.7 G/DL (ref 31.5–35.7)
MCV RBC AUTO: 81.1 FL (ref 79–97)
MONOCYTES # BLD AUTO: 0.5 10*3/MM3 (ref 0.1–0.9)
MONOCYTES NFR BLD AUTO: 8.3 % (ref 5–12)
NEUTROPHILS # BLD AUTO: 3.7 10*3/MM3 (ref 1.7–7)
NEUTROPHILS NFR BLD AUTO: 63.3 % (ref 42.7–76)
PLATELET # BLD AUTO: 373 10*3/MM3 (ref 140–450)
PMV BLD AUTO: 7.1 FL (ref 6–12)
RBC # BLD AUTO: 4.95 10*6/MM3 (ref 3.77–5.28)
TIBC SERPL-MCNC: 489 MCG/DL (ref 298–536)
TRANSFERRIN SERPL-MCNC: 328 MG/DL (ref 200–360)
WBC NRBC COR # BLD: 5.9 10*3/MM3 (ref 3.4–10.8)

## 2020-05-13 PROCEDURE — 83540 ASSAY OF IRON: CPT

## 2020-05-13 PROCEDURE — 36415 COLL VENOUS BLD VENIPUNCTURE: CPT

## 2020-05-13 PROCEDURE — 84466 ASSAY OF TRANSFERRIN: CPT

## 2020-05-13 PROCEDURE — 82728 ASSAY OF FERRITIN: CPT

## 2020-05-13 PROCEDURE — 85025 COMPLETE CBC W/AUTO DIFF WBC: CPT

## 2020-05-13 PROCEDURE — 99213 OFFICE O/P EST LOW 20 MIN: CPT | Performed by: NURSE PRACTITIONER

## 2020-05-13 RX ORDER — SODIUM CHLORIDE 9 MG/ML
250 INJECTION, SOLUTION INTRAVENOUS ONCE
Status: CANCELLED | OUTPATIENT
Start: 2020-05-27

## 2020-05-13 RX ORDER — DIPHENHYDRAMINE HCL 25 MG
25 CAPSULE ORAL ONCE
Status: CANCELLED | OUTPATIENT
Start: 2020-05-20

## 2020-05-13 RX ORDER — SODIUM CHLORIDE 9 MG/ML
250 INJECTION, SOLUTION INTRAVENOUS ONCE
Status: CANCELLED | OUTPATIENT
Start: 2020-05-20

## 2020-05-13 RX ORDER — FAMOTIDINE 10 MG/ML
20 INJECTION, SOLUTION INTRAVENOUS ONCE
Status: CANCELLED | OUTPATIENT
Start: 2020-05-20

## 2020-05-13 RX ORDER — DIPHENHYDRAMINE HCL 25 MG
25 CAPSULE ORAL ONCE
Status: CANCELLED | OUTPATIENT
Start: 2020-05-27

## 2020-05-13 RX ORDER — FAMOTIDINE 10 MG/ML
20 INJECTION, SOLUTION INTRAVENOUS ONCE
Status: CANCELLED | OUTPATIENT
Start: 2020-05-27

## 2020-05-13 NOTE — PROGRESS NOTES
DATE OF VISIT: 5/13/2020    REASON FOR VISIT: Followup for iron deficiency anemia     HISTORY OF PRESENT ILLNESS: The patient is a very pleasant 64 y.o. female  with past medical history significant for iron deficiency anemia diagnosed September 2019.  Patient was treated with 2 dose of IV iron. The patient is here today for gradual follow-up visit.    SUBJECTIVE: The patient is here today by herself.  She has been doing fairly well since her last visit.  She has noticed over the last month she has had increasing fatigue and feeling like she is having more weakness as well as shortness of breath with activity.  She has also had some intermittent dizzy episodes.  She has had some occasional dark stools.  She denies GI upset.  She is still taking her Protonix daily.  She denies overt bleeding with bowel movements, nausea, or vomiting.  She has been doing monthly B12 injections at home request a refill on this prescription.  She has had no fever, chills, night sweats, or unexplained weight loss.    PAST MEDICAL HISTORY/SOCIAL HISTORY/FAMILY HISTORY: Reviewed by me and unchanged from my documentation done on 05/13/20.    Review of Systems   Constitutional: Positive for fatigue. Negative for activity change, appetite change, chills, fever and unexpected weight change.   HENT: Negative for hearing loss, mouth sores, nosebleeds, sore throat and trouble swallowing.    Eyes: Negative for visual disturbance.   Respiratory: Negative for cough, chest tightness, shortness of breath and wheezing.    Cardiovascular: Negative for chest pain, palpitations and leg swelling.   Gastrointestinal: Negative for abdominal distention, abdominal pain, blood in stool, constipation, diarrhea, nausea, rectal pain and vomiting.   Endocrine: Negative for cold intolerance and heat intolerance.   Genitourinary: Negative for difficulty urinating, dysuria, frequency and urgency.   Musculoskeletal: Negative for arthralgias, back pain, gait problem,  "joint swelling and myalgias.   Skin: Negative for rash.   Neurological: Positive for dizziness and weakness. Negative for tremors, syncope, light-headedness, numbness and headaches.   Hematological: Negative for adenopathy. Does not bruise/bleed easily.   Psychiatric/Behavioral: Negative for confusion, sleep disturbance and suicidal ideas. The patient is not nervous/anxious.          Current Outpatient Medications:   •  Alendronate Sodium (FOSAMAX PO), Take  by mouth Daily., Disp: , Rfl:   •  BD INTEGRA SYRINGE 25G X 1\" 3 ML misc, USE WITH VITAMIN B12 INJECTIONS EVERY 28 DAYS, Disp: 3 each, Rfl: 0  •  cholecalciferol (VITAMIN D3) 25 MCG (1000 UT) tablet, Take 1,000 Units by mouth Daily., Disp: , Rfl:   •  pantoprazole (PROTONIX) 40 MG EC tablet, Take 1 tablet by mouth Daily. Suppresses stomach acid for ulcer healing., Disp: 30 tablet, Rfl: 11  •  cyanocobalamin 1000 MCG/ML injection, Inject 1 mL into the appropriate muscle as directed by prescriber Every 28 (Twenty-Eight) Days., Disp: 30 mL, Rfl: 30    PHYSICAL EXAMINATION:   /96 Comment: pt has PRN medication to take at home if needed  Pulse 91   Temp 99.3 °F (37.4 °C) (Temporal)   Resp 20   Ht 154.9 cm (61\")   Wt 76.1 kg (167 lb 11.2 oz)   SpO2 99%   BMI 31.69 kg/m²    ECOG Performance Status: 1 - Symptomatic but completely ambulatory  General Appearance:  alert, cooperative, no apparent distress and appears stated age   Neurologic/Psychiatric: A&O x 3, gait steady, appropriate affect, strength 5/5 in all muscle groups   HEENT:  Normocephalic, without obvious abnormality, mucous membranes moist   Neck: Supple, symmetrical, trachea midline, no adenopathy;  No thyromegaly, masses, or tenderness   Lungs:   Clear to auscultation bilaterally; respirations regular, even, and unlabored bilaterally   Heart:  Regular rate and rhythm, no murmurs appreciated   Abdomen:   Soft, non-tender, non-distended and no organomegaly   Lymph nodes: No cervical, " supraclavicular, inguinal or axillary adenopathy noted   Extremities: Normal, atraumatic; no clubbing, cyanosis, or edema    Skin: No rashes, ulcers, or suspicious lesions noted     Lab on 05/13/2020   Component Date Value Ref Range Status   • WBC 05/13/2020 5.90  3.40 - 10.80 10*3/mm3 Final   • RBC 05/13/2020 4.95  3.77 - 5.28 10*6/mm3 Final   • Hemoglobin 05/13/2020 11.9* 12.0 - 15.9 g/dL Final   • Hematocrit 05/13/2020 40.1  34.0 - 46.6 % Final   • RDW 05/13/2020 16.6* 12.3 - 15.4 % Final   • MCV 05/13/2020 81.1  79.0 - 97.0 fL Final   • MCH 05/13/2020 24.1* 26.6 - 33.0 pg Final   • MCHC 05/13/2020 29.7* 31.5 - 35.7 g/dL Final   • MPV 05/13/2020 7.1  6.0 - 12.0 fL Final   • Platelets 05/13/2020 373  140 - 450 10*3/mm3 Final   • Neutrophil % 05/13/2020 63.3  42.7 - 76.0 % Final   • Lymphocyte % 05/13/2020 28.4  19.6 - 45.3 % Final   • Monocyte % 05/13/2020 8.3  5.0 - 12.0 % Final   • Neutrophils, Absolute 05/13/2020 3.70  1.70 - 7.00 10*3/mm3 Final   • Lymphocytes, Absolute 05/13/2020 1.70  0.70 - 3.10 10*3/mm3 Final   • Monocytes, Absolute 05/13/2020 0.50  0.10 - 0.90 10*3/mm3 Final        No results found.    ASSESSMENT: The patient is a very pleasant 64 y.o. female  with severe anemia    PROBLEM LIST:   1.  Severe anemia:  A.  Diagnosed July 2019  B.  Induced by chronic GI blood loss  2.  Severe iron deficiency:  A.  EGD done July 5, 2019 revealed erosive gastritis  B.  Colonoscopy done July 12, 2019 no abnormalities noted  3.  Microcytosis  4.  Vitamin B12 deficiency  5.  Erosive gastritis:  A.  On Protonix 40 mg daily    PLAN:  1.  I reviewed the lab results with the patient.  I told her she has relapsed mild anemia with hemoglobin 11.9 today.  We will follow-up on her iron studies and notify her of results.    2.  We will go ahead and arrange for repeat IV iron infusions using Injectafer given on days 1 and 8.  She will be premedicated with Pepcid and Benadryl.    3.  We will continue monthly vitamin B12  injections that she is completing at home.  She was given a refill on this prescription today.    4.  We will consider referral back to GI if she has frequent need for IV iron replacement or new GI symptoms for repeat evaluation.  She will continue her Protonix 40 mg daily for history of erosive gastritis.    5.  Patient's blood pressure was elevated at her visit today.  She does check it at home and has a medication to take as needed for systolic blood pressure over 200.  She has not had to use this at all since it was prescribed by her primary care physician, Dr. Hernandez.    6.  We will see the patient back in 6 months with repeat labs including CBC, ferritin, iron profile, and vitamin B12.  7.  We will target IV iron infusions if ferritin is less than 30 or saturation less than 10%, or if she has evidence of relapsing anemia.    Kathryn Lyon, APRN  5/13/2020

## 2020-05-20 ENCOUNTER — HOSPITAL ENCOUNTER (OUTPATIENT)
Dept: ONCOLOGY | Facility: HOSPITAL | Age: 65
Setting detail: INFUSION SERIES
Discharge: HOME | End: 2020-05-20

## 2020-05-20 VITALS
HEART RATE: 78 BPM | RESPIRATION RATE: 16 BRPM | DIASTOLIC BLOOD PRESSURE: 73 MMHG | WEIGHT: 167 LBS | HEIGHT: 61 IN | TEMPERATURE: 98.1 F | BODY MASS INDEX: 31.53 KG/M2 | SYSTOLIC BLOOD PRESSURE: 158 MMHG

## 2020-05-20 DIAGNOSIS — K90.9 IRON MALABSORPTION: ICD-10-CM

## 2020-05-20 DIAGNOSIS — D50.9 IRON DEFICIENCY ANEMIA, UNSPECIFIED IRON DEFICIENCY ANEMIA TYPE: Primary | ICD-10-CM

## 2020-05-20 PROCEDURE — 25010000002 FERRIC CARBOXYMALTOSE 750 MG/15ML SOLUTION 15 ML VIAL: Performed by: NURSE PRACTITIONER

## 2020-05-20 PROCEDURE — 96375 TX/PRO/DX INJ NEW DRUG ADDON: CPT

## 2020-05-20 PROCEDURE — 63710000001 DIPHENHYDRAMINE PER 50 MG: Performed by: NURSE PRACTITIONER

## 2020-05-20 PROCEDURE — 96365 THER/PROPH/DIAG IV INF INIT: CPT

## 2020-05-20 RX ORDER — DIPHENHYDRAMINE HCL 25 MG
25 CAPSULE ORAL ONCE
Status: COMPLETED | OUTPATIENT
Start: 2020-05-20 | End: 2020-05-20

## 2020-05-20 RX ORDER — SODIUM CHLORIDE 9 MG/ML
250 INJECTION, SOLUTION INTRAVENOUS ONCE
Status: COMPLETED | OUTPATIENT
Start: 2020-05-20 | End: 2020-05-20

## 2020-05-20 RX ORDER — FAMOTIDINE 10 MG/ML
20 INJECTION, SOLUTION INTRAVENOUS ONCE
Status: COMPLETED | OUTPATIENT
Start: 2020-05-20 | End: 2020-05-20

## 2020-05-20 RX ADMIN — DIPHENHYDRAMINE HYDROCHLORIDE 25 MG: 25 CAPSULE ORAL at 13:25

## 2020-05-20 RX ADMIN — FERRIC CARBOXYMALTOSE INJECTION 750 MG: 50 INJECTION, SOLUTION INTRAVENOUS at 13:31

## 2020-05-20 RX ADMIN — FAMOTIDINE 20 MG: 10 INJECTION, SOLUTION INTRAVENOUS at 13:25

## 2020-05-20 RX ADMIN — SODIUM CHLORIDE 250 ML: 9 INJECTION, SOLUTION INTRAVENOUS at 13:29

## 2020-05-27 ENCOUNTER — HOSPITAL ENCOUNTER (OUTPATIENT)
Dept: ONCOLOGY | Facility: HOSPITAL | Age: 65
Setting detail: INFUSION SERIES
Discharge: HOME | End: 2020-05-27

## 2020-05-27 VITALS
TEMPERATURE: 98.4 F | WEIGHT: 167 LBS | BODY MASS INDEX: 31.53 KG/M2 | RESPIRATION RATE: 16 BRPM | SYSTOLIC BLOOD PRESSURE: 141 MMHG | HEART RATE: 62 BPM | DIASTOLIC BLOOD PRESSURE: 73 MMHG | HEIGHT: 61 IN

## 2020-05-27 DIAGNOSIS — D50.9 IRON DEFICIENCY ANEMIA, UNSPECIFIED IRON DEFICIENCY ANEMIA TYPE: Primary | ICD-10-CM

## 2020-05-27 DIAGNOSIS — K90.9 IRON MALABSORPTION: ICD-10-CM

## 2020-05-27 PROCEDURE — 25010000002 FERRIC CARBOXYMALTOSE 750 MG/15ML SOLUTION 15 ML VIAL: Performed by: NURSE PRACTITIONER

## 2020-05-27 PROCEDURE — 63710000001 DIPHENHYDRAMINE PER 50 MG: Performed by: NURSE PRACTITIONER

## 2020-05-27 PROCEDURE — 96375 TX/PRO/DX INJ NEW DRUG ADDON: CPT

## 2020-05-27 PROCEDURE — 96374 THER/PROPH/DIAG INJ IV PUSH: CPT

## 2020-05-27 RX ORDER — FAMOTIDINE 10 MG/ML
20 INJECTION, SOLUTION INTRAVENOUS ONCE
Status: COMPLETED | OUTPATIENT
Start: 2020-05-27 | End: 2020-05-27

## 2020-05-27 RX ORDER — SODIUM CHLORIDE 9 MG/ML
250 INJECTION, SOLUTION INTRAVENOUS ONCE
Status: DISCONTINUED | OUTPATIENT
Start: 2020-05-27 | End: 2020-05-28 | Stop reason: HOSPADM

## 2020-05-27 RX ORDER — DIPHENHYDRAMINE HCL 25 MG
25 CAPSULE ORAL ONCE
Status: COMPLETED | OUTPATIENT
Start: 2020-05-27 | End: 2020-05-27

## 2020-05-27 RX ADMIN — FERRIC CARBOXYMALTOSE INJECTION 750 MG: 50 INJECTION, SOLUTION INTRAVENOUS at 13:36

## 2020-05-27 RX ADMIN — FAMOTIDINE 20 MG: 10 INJECTION, SOLUTION INTRAVENOUS at 13:30

## 2020-05-27 RX ADMIN — DIPHENHYDRAMINE HYDROCHLORIDE 25 MG: 25 CAPSULE ORAL at 13:29

## 2020-05-28 RX ORDER — CYANOCOBALAMIN 1000 UG/ML
1000 INJECTION, SOLUTION INTRAMUSCULAR; SUBCUTANEOUS
Qty: 30 ML | Refills: 30 | Status: SHIPPED | OUTPATIENT
Start: 2020-05-28 | End: 2020-05-28 | Stop reason: SDUPTHER

## 2020-05-28 RX ORDER — CYANOCOBALAMIN 1000 UG/ML
1000 INJECTION, SOLUTION INTRAMUSCULAR; SUBCUTANEOUS
Qty: 30 ML | Refills: 3 | Status: SHIPPED | OUTPATIENT
Start: 2020-05-28 | End: 2022-05-24

## 2021-09-27 ENCOUNTER — CONSULT (OUTPATIENT)
Dept: ONCOLOGY | Facility: CLINIC | Age: 66
End: 2021-09-27

## 2021-09-27 ENCOUNTER — LAB (OUTPATIENT)
Dept: LAB | Facility: HOSPITAL | Age: 66
End: 2021-09-27

## 2021-09-27 VITALS
HEART RATE: 71 BPM | WEIGHT: 166 LBS | RESPIRATION RATE: 16 BRPM | HEIGHT: 61 IN | BODY MASS INDEX: 31.34 KG/M2 | OXYGEN SATURATION: 97 % | SYSTOLIC BLOOD PRESSURE: 192 MMHG | DIASTOLIC BLOOD PRESSURE: 83 MMHG | TEMPERATURE: 97.3 F

## 2021-09-27 DIAGNOSIS — D50.9 IRON DEFICIENCY ANEMIA, UNSPECIFIED IRON DEFICIENCY ANEMIA TYPE: ICD-10-CM

## 2021-09-27 DIAGNOSIS — K90.9 IRON MALABSORPTION: ICD-10-CM

## 2021-09-27 DIAGNOSIS — D50.9 IRON DEFICIENCY ANEMIA, UNSPECIFIED IRON DEFICIENCY ANEMIA TYPE: Primary | ICD-10-CM

## 2021-09-27 LAB
ERYTHROCYTE [DISTWIDTH] IN BLOOD BY AUTOMATED COUNT: 18.3 % (ref 12.3–15.4)
FERRITIN SERPL-MCNC: 6 NG/ML (ref 13–150)
HCT VFR BLD AUTO: 28.1 % (ref 34–46.6)
HGB BLD-MCNC: 8.2 G/DL (ref 12–15.9)
IRON 24H UR-MRATE: 10 MCG/DL (ref 37–145)
IRON SATN MFR SERPL: 2 % (ref 20–50)
LYMPHOCYTES # BLD AUTO: 1.5 10*3/MM3 (ref 0.7–3.1)
LYMPHOCYTES NFR BLD AUTO: 21.1 % (ref 19.6–45.3)
MCH RBC QN AUTO: 20.8 PG (ref 26.6–33)
MCHC RBC AUTO-ENTMCNC: 29.1 G/DL (ref 31.5–35.7)
MCV RBC AUTO: 71.2 FL (ref 79–97)
MONOCYTES # BLD AUTO: 0.6 10*3/MM3 (ref 0.1–0.9)
MONOCYTES NFR BLD AUTO: 8 % (ref 5–12)
NEUTROPHILS NFR BLD AUTO: 4.9 10*3/MM3 (ref 1.7–7)
NEUTROPHILS NFR BLD AUTO: 70.9 % (ref 42.7–76)
PLATELET # BLD AUTO: 436 10*3/MM3 (ref 140–450)
PMV BLD AUTO: 6.6 FL (ref 6–12)
RBC # BLD AUTO: 3.95 10*6/MM3 (ref 3.77–5.28)
TIBC SERPL-MCNC: 505 MCG/DL (ref 298–536)
TRANSFERRIN SERPL-MCNC: 339 MG/DL (ref 200–360)
VIT B12 BLD-MCNC: 765 PG/ML (ref 211–946)
WBC # BLD AUTO: 6.9 10*3/MM3 (ref 3.4–10.8)

## 2021-09-27 PROCEDURE — 84466 ASSAY OF TRANSFERRIN: CPT

## 2021-09-27 PROCEDURE — 85025 COMPLETE CBC W/AUTO DIFF WBC: CPT

## 2021-09-27 PROCEDURE — 82607 VITAMIN B-12: CPT

## 2021-09-27 PROCEDURE — 99214 OFFICE O/P EST MOD 30 MIN: CPT | Performed by: INTERNAL MEDICINE

## 2021-09-27 PROCEDURE — 36415 COLL VENOUS BLD VENIPUNCTURE: CPT

## 2021-09-27 PROCEDURE — 82728 ASSAY OF FERRITIN: CPT

## 2021-09-27 PROCEDURE — 83540 ASSAY OF IRON: CPT

## 2021-09-27 RX ORDER — DEXTROMETHORPHAN HYDROBROMIDE AND PROMETHAZINE HYDROCHLORIDE 15; 6.25 MG/5ML; MG/5ML
SYRUP ORAL
COMMUNITY
End: 2022-05-24

## 2021-09-27 RX ORDER — OMEPRAZOLE 40 MG/1
20 CAPSULE, DELAYED RELEASE ORAL DAILY
COMMUNITY
Start: 2021-07-27

## 2021-09-27 RX ORDER — FAMOTIDINE 10 MG/ML
20 INJECTION, SOLUTION INTRAVENOUS ONCE
Status: CANCELLED | OUTPATIENT
Start: 2021-10-13

## 2021-09-27 RX ORDER — DIPHENHYDRAMINE HCL 25 MG
25 CAPSULE ORAL ONCE
Status: CANCELLED | OUTPATIENT
Start: 2021-10-13

## 2021-09-27 RX ORDER — FAMOTIDINE 10 MG/ML
20 INJECTION, SOLUTION INTRAVENOUS ONCE
Status: CANCELLED | OUTPATIENT
Start: 2021-10-06

## 2021-09-27 RX ORDER — DIPHENHYDRAMINE HCL 25 MG
25 CAPSULE ORAL ONCE
Status: CANCELLED | OUTPATIENT
Start: 2021-10-06

## 2021-09-27 RX ORDER — BUPROPION HYDROCHLORIDE 300 MG/1
TABLET ORAL
COMMUNITY
End: 2022-05-24

## 2021-09-27 RX ORDER — SODIUM CHLORIDE 9 MG/ML
250 INJECTION, SOLUTION INTRAVENOUS ONCE
Status: CANCELLED | OUTPATIENT
Start: 2021-10-06

## 2021-09-27 RX ORDER — SODIUM CHLORIDE 9 MG/ML
250 INJECTION, SOLUTION INTRAVENOUS ONCE
Status: CANCELLED | OUTPATIENT
Start: 2021-10-13

## 2021-09-27 RX ORDER — FERROUS SULFATE 325(65) MG
TABLET ORAL
COMMUNITY
End: 2022-05-24

## 2021-09-27 NOTE — PROGRESS NOTES
DATE OF VISIT: 9/27/2021    REASON FOR VISIT: Followup for iron deficiency anemia     HISTORY OF PRESENT ILLNESS: The patient is a very pleasant 65 y.o. female  with past medical history significant for iron deficiency anemia diagnosed September 2019.  Patient was treated with 2 dose of IV iron. The patient is here today for gradual follow-up visit.    SUBJECTIVE: The patient is here today by herself. She is complaining of fatigue but she has been having poor concentration. She is complaining of restless legs at night.    PAST MEDICAL HISTORY/SOCIAL HISTORY/FAMILY HISTORY: Reviewed by me and unchanged from my documentation done on 09/27/21.    Review of Systems   Constitutional: Positive for fatigue. Negative for activity change, appetite change, chills, fever and unexpected weight change.   HENT: Negative for hearing loss, mouth sores, nosebleeds, sore throat and trouble swallowing.    Eyes: Negative for visual disturbance.   Respiratory: Negative for cough, chest tightness, shortness of breath and wheezing.    Cardiovascular: Negative for chest pain, palpitations and leg swelling.   Gastrointestinal: Negative for abdominal distention, abdominal pain, blood in stool, constipation, diarrhea, nausea, rectal pain and vomiting.   Endocrine: Negative for cold intolerance and heat intolerance.   Genitourinary: Negative for difficulty urinating, dysuria, frequency and urgency.   Musculoskeletal: Negative for arthralgias, back pain, gait problem, joint swelling and myalgias.   Skin: Negative for rash.   Neurological: Positive for dizziness and weakness. Negative for tremors, syncope, light-headedness, numbness and headaches.   Hematological: Negative for adenopathy. Does not bruise/bleed easily.   Psychiatric/Behavioral: Negative for confusion, sleep disturbance and suicidal ideas. The patient is not nervous/anxious.          Current Outpatient Medications:   •  ferrous sulfate 325 (65 FE) MG tablet, ferrous sulfate 325 mg  "(65 mg iron) tablet, Disp: , Rfl:   •  omeprazole (priLOSEC) 40 MG capsule, , Disp: , Rfl:   •  Alendronate Sodium (FOSAMAX PO), Take  by mouth Daily., Disp: , Rfl:   •  buPROPion XL (WELLBUTRIN XL) 300 MG 24 hr tablet, bupropion HCl  mg 24 hr tablet, extended release, Disp: , Rfl:   •  cholecalciferol (VITAMIN D3) 25 MCG (1000 UT) tablet, Take 1,000 Units by mouth Daily., Disp: , Rfl:   •  cyanocobalamin 1000 MCG/ML injection, Inject 1 mL into the appropriate muscle as directed by prescriber Every 28 (Twenty-Eight) Days., Disp: 30 mL, Rfl: 3  •  metoprolol tartrate (LOPRESSOR) 25 MG tablet, metoprolol tartrate 25 mg tablet, Disp: , Rfl:   •  pantoprazole (PROTONIX) 40 MG EC tablet, Take 1 tablet by mouth Daily. Suppresses stomach acid for ulcer healing., Disp: 30 tablet, Rfl: 11  •  promethazine-dextromethorphan (PROMETHAZINE-DM) 6.25-15 MG/5ML syrup, promethazine-DM 6.25 mg-15 mg/5 mL oral syrup, Disp: , Rfl:   •  Syringe/Needle, Disp, (BD Integra Syringe) 25G X 1\" 3 ML misc, Inject 1 syringe into the appropriate muscle as directed by prescriber Every 30 (Thirty) Days., Disp: 3 each, Rfl: 5    PHYSICAL EXAMINATION:   BP (!) 192/83   Pulse 71   Temp 97.3 °F (36.3 °C) (Temporal)   Resp 16   Ht 154.9 cm (61\")   Wt 75.3 kg (166 lb)   SpO2 97%   BMI 31.37 kg/m²    ECOG Performance Status: 1 - Symptomatic but completely ambulatory  General Appearance:  alert, cooperative, no apparent distress and appears stated age   Neurologic/Psychiatric: A&O x 3, gait steady, appropriate affect, strength 5/5 in all muscle groups   HEENT:  Normocephalic, without obvious abnormality, mucous membranes moist   Neck: Supple, symmetrical, trachea midline, no adenopathy;  No thyromegaly, masses, or tenderness   Lungs:   Clear to auscultation bilaterally; respirations regular, even, and unlabored bilaterally   Heart:  Regular rate and rhythm, no murmurs appreciated   Abdomen:   Soft, non-tender, non-distended and no organomegaly "   Lymph nodes: No cervical, supraclavicular, inguinal or axillary adenopathy noted   Extremities: Normal, atraumatic; no clubbing, cyanosis, or edema    Skin: No rashes, ulcers, or suspicious lesions noted     No visits with results within 2 Week(s) from this visit.   Latest known visit with results is:   Lab on 05/13/2020   Component Date Value Ref Range Status   • Iron 05/13/2020 32* 37 - 145 mcg/dL Final   • Iron Saturation 05/13/2020 7* 20 - 50 % Final   • Transferrin 05/13/2020 328  200 - 360 mg/dL Final   • TIBC 05/13/2020 489  298 - 536 mcg/dL Final   • Ferritin 05/13/2020 9.38* 13.00 - 150.00 ng/mL Final   • WBC 05/13/2020 5.90  3.40 - 10.80 10*3/mm3 Final   • RBC 05/13/2020 4.95  3.77 - 5.28 10*6/mm3 Final   • Hemoglobin 05/13/2020 11.9* 12.0 - 15.9 g/dL Final   • Hematocrit 05/13/2020 40.1  34.0 - 46.6 % Final   • RDW 05/13/2020 16.6* 12.3 - 15.4 % Final   • MCV 05/13/2020 81.1  79.0 - 97.0 fL Final   • MCH 05/13/2020 24.1* 26.6 - 33.0 pg Final   • MCHC 05/13/2020 29.7* 31.5 - 35.7 g/dL Final   • MPV 05/13/2020 7.1  6.0 - 12.0 fL Final   • Platelets 05/13/2020 373  140 - 450 10*3/mm3 Final   • Neutrophil % 05/13/2020 63.3  42.7 - 76.0 % Final   • Lymphocyte % 05/13/2020 28.4  19.6 - 45.3 % Final   • Monocyte % 05/13/2020 8.3  5.0 - 12.0 % Final   • Neutrophils, Absolute 05/13/2020 3.70  1.70 - 7.00 10*3/mm3 Final   • Lymphocytes, Absolute 05/13/2020 1.70  0.70 - 3.10 10*3/mm3 Final   • Monocytes, Absolute 05/13/2020 0.50  0.10 - 0.90 10*3/mm3 Final        No results found.    ASSESSMENT: The patient is a very pleasant 65 y.o. female  with severe anemia    PROBLEM LIST:   1.  Severe anemia:  A.  Diagnosed July 2019  B.  Induced by chronic GI blood loss  2.  Severe iron deficiency:  A.  EGD done July 5, 2019 revealed erosive gastritis  B.  Colonoscopy done July 12, 2019 no abnormalities noted  3.  Microcytosis  4.  Vitamin B12 deficiency  5.  Erosive gastritis:  A.  On Prilosec 40 mg daily    PLAN:  1.  I  reviewed the lab results with the patient from May 13, 2020 that revealed severe iron deficiency with saturation 7%.  I also go over the blood results from August 2021 done by her primary care provider that revealed severe iron deficiency hemoglobin down to 8.9 iron saturation 3% serum iron 13.  2.  We will go ahead and arrange for repeat IV iron infusions using Injectafer given on days 1 and 8.  She will be premedicated with Pepcid and Benadryl.    3. I will check her vitamin B12 level today. We will consider adding B12 replacement if needed.  4. I will refer her back to GI for recurrent iron deficiency anemia. The patient has been compliant with her Prilosec 40 mg daily.    5.   We will see the patient back in 3 months with repeat labs including CBC, ferritin, iron profile, and vitamin B12.  6.  We will target IV iron infusions if ferritin is less than 30 or saturation less than 10%, or if she has evidence of relapsing anemia.    Raghav Burgos MD  9/27/2021

## 2021-10-06 ENCOUNTER — HOSPITAL ENCOUNTER (OUTPATIENT)
Dept: ONCOLOGY | Facility: HOSPITAL | Age: 66
Setting detail: INFUSION SERIES
Discharge: HOME | End: 2021-10-06

## 2021-10-06 VITALS
DIASTOLIC BLOOD PRESSURE: 57 MMHG | RESPIRATION RATE: 16 BRPM | HEIGHT: 61 IN | BODY MASS INDEX: 31.15 KG/M2 | HEART RATE: 99 BPM | TEMPERATURE: 98.2 F | SYSTOLIC BLOOD PRESSURE: 155 MMHG | WEIGHT: 165 LBS

## 2021-10-06 DIAGNOSIS — K90.9 IRON MALABSORPTION: ICD-10-CM

## 2021-10-06 DIAGNOSIS — D50.9 IRON DEFICIENCY ANEMIA, UNSPECIFIED IRON DEFICIENCY ANEMIA TYPE: Primary | ICD-10-CM

## 2021-10-06 PROCEDURE — 25010000002 FERRIC CARBOXYMALTOSE 750 MG/15ML SOLUTION 15 ML VIAL: Performed by: INTERNAL MEDICINE

## 2021-10-06 PROCEDURE — 96374 THER/PROPH/DIAG INJ IV PUSH: CPT

## 2021-10-06 PROCEDURE — 63710000001 DIPHENHYDRAMINE PER 50 MG: Performed by: INTERNAL MEDICINE

## 2021-10-06 PROCEDURE — 96375 TX/PRO/DX INJ NEW DRUG ADDON: CPT

## 2021-10-06 RX ORDER — DIPHENHYDRAMINE HCL 25 MG
25 CAPSULE ORAL ONCE
Status: COMPLETED | OUTPATIENT
Start: 2021-10-06 | End: 2021-10-06

## 2021-10-06 RX ORDER — SODIUM CHLORIDE 9 MG/ML
250 INJECTION, SOLUTION INTRAVENOUS ONCE
Status: COMPLETED | OUTPATIENT
Start: 2021-10-06 | End: 2021-10-06

## 2021-10-06 RX ORDER — FAMOTIDINE 10 MG/ML
20 INJECTION, SOLUTION INTRAVENOUS ONCE
Status: COMPLETED | OUTPATIENT
Start: 2021-10-06 | End: 2021-10-06

## 2021-10-06 RX ADMIN — SODIUM CHLORIDE 250 ML: 9 INJECTION, SOLUTION INTRAVENOUS at 14:09

## 2021-10-06 RX ADMIN — DIPHENHYDRAMINE HYDROCHLORIDE 25 MG: 25 CAPSULE ORAL at 14:08

## 2021-10-06 RX ADMIN — FAMOTIDINE 20 MG: 10 INJECTION, SOLUTION INTRAVENOUS at 14:09

## 2021-10-06 RX ADMIN — FERRIC CARBOXYMALTOSE INJECTION 750 MG: 50 INJECTION, SOLUTION INTRAVENOUS at 14:22

## 2021-10-10 ENCOUNTER — APPOINTMENT (OUTPATIENT)
Dept: PREADMISSION TESTING | Facility: HOSPITAL | Age: 66
End: 2021-10-10

## 2021-10-10 LAB — SARS-COV-2 RNA PNL SPEC NAA+PROBE: NOT DETECTED

## 2021-10-10 PROCEDURE — U0004 COV-19 TEST NON-CDC HGH THRU: HCPCS

## 2021-10-10 PROCEDURE — C9803 HOPD COVID-19 SPEC COLLECT: HCPCS

## 2021-10-12 ENCOUNTER — OUTSIDE FACILITY SERVICE (OUTPATIENT)
Dept: GASTROENTEROLOGY | Facility: CLINIC | Age: 66
End: 2021-10-12

## 2021-10-12 DIAGNOSIS — R10.10 UPPER ABDOMINAL PAIN: ICD-10-CM

## 2021-10-12 DIAGNOSIS — K44.9 LARGE HIATAL HERNIA: Primary | ICD-10-CM

## 2021-10-12 DIAGNOSIS — R68.81 EARLY SATIETY: ICD-10-CM

## 2021-10-12 DIAGNOSIS — K25.7 CHRONIC CAMERON ULCER: ICD-10-CM

## 2021-10-12 DIAGNOSIS — D50.0 IRON DEFICIENCY ANEMIA DUE TO CHRONIC BLOOD LOSS: ICD-10-CM

## 2021-10-12 PROCEDURE — 88305 TISSUE EXAM BY PATHOLOGIST: CPT | Performed by: INTERNAL MEDICINE

## 2021-10-12 PROCEDURE — 43239 EGD BIOPSY SINGLE/MULTIPLE: CPT | Performed by: INTERNAL MEDICINE

## 2021-10-13 ENCOUNTER — HOSPITAL ENCOUNTER (OUTPATIENT)
Dept: ONCOLOGY | Facility: HOSPITAL | Age: 66
Setting detail: INFUSION SERIES
Discharge: HOME | End: 2021-10-13

## 2021-10-13 ENCOUNTER — LAB REQUISITION (OUTPATIENT)
Dept: LAB | Facility: HOSPITAL | Age: 66
End: 2021-10-13

## 2021-10-13 ENCOUNTER — PATIENT MESSAGE (OUTPATIENT)
Dept: GASTROENTEROLOGY | Facility: CLINIC | Age: 66
End: 2021-10-13

## 2021-10-13 VITALS
WEIGHT: 165 LBS | HEART RATE: 76 BPM | SYSTOLIC BLOOD PRESSURE: 145 MMHG | DIASTOLIC BLOOD PRESSURE: 76 MMHG | BODY MASS INDEX: 31.15 KG/M2 | RESPIRATION RATE: 18 BRPM | TEMPERATURE: 98.2 F | HEIGHT: 61 IN

## 2021-10-13 DIAGNOSIS — D50.9 IRON DEFICIENCY ANEMIA, UNSPECIFIED: ICD-10-CM

## 2021-10-13 DIAGNOSIS — D50.9 IRON DEFICIENCY ANEMIA, UNSPECIFIED IRON DEFICIENCY ANEMIA TYPE: Primary | ICD-10-CM

## 2021-10-13 DIAGNOSIS — K90.9 IRON MALABSORPTION: ICD-10-CM

## 2021-10-13 PROCEDURE — 63710000001 DIPHENHYDRAMINE PER 50 MG: Performed by: INTERNAL MEDICINE

## 2021-10-13 PROCEDURE — 25010000002 FERRIC CARBOXYMALTOSE 750 MG/15ML SOLUTION 15 ML VIAL: Performed by: INTERNAL MEDICINE

## 2021-10-13 PROCEDURE — 96374 THER/PROPH/DIAG INJ IV PUSH: CPT

## 2021-10-13 PROCEDURE — 96375 TX/PRO/DX INJ NEW DRUG ADDON: CPT

## 2021-10-13 RX ORDER — FAMOTIDINE 10 MG/ML
20 INJECTION, SOLUTION INTRAVENOUS ONCE
Status: COMPLETED | OUTPATIENT
Start: 2021-10-13 | End: 2021-10-13

## 2021-10-13 RX ORDER — DIPHENHYDRAMINE HCL 25 MG
25 CAPSULE ORAL ONCE
Status: COMPLETED | OUTPATIENT
Start: 2021-10-13 | End: 2021-10-13

## 2021-10-13 RX ORDER — SODIUM CHLORIDE 9 MG/ML
250 INJECTION, SOLUTION INTRAVENOUS ONCE
Status: COMPLETED | OUTPATIENT
Start: 2021-10-13 | End: 2021-10-13

## 2021-10-13 RX ADMIN — FAMOTIDINE 20 MG: 10 INJECTION, SOLUTION INTRAVENOUS at 14:06

## 2021-10-13 RX ADMIN — DIPHENHYDRAMINE HYDROCHLORIDE 25 MG: 25 CAPSULE ORAL at 14:06

## 2021-10-13 RX ADMIN — SODIUM CHLORIDE 250 ML: 9 INJECTION, SOLUTION INTRAVENOUS at 14:05

## 2021-10-13 RX ADMIN — FERRIC CARBOXYMALTOSE INJECTION 750 MG: 50 INJECTION, SOLUTION INTRAVENOUS at 14:18

## 2021-10-13 NOTE — TELEPHONE ENCOUNTER
From: Zulema Jarquin  To: Mina Osullivan MD  Sent: 10/13/2021 7:13 AM EDT  Subject: Question regarding ENDOSCOPY, INT    Can’t review my results.

## 2021-10-14 LAB
CYTO UR: NORMAL
LAB AP CASE REPORT: NORMAL
LAB AP CLINICAL INFORMATION: NORMAL
PATH REPORT.FINAL DX SPEC: NORMAL
PATH REPORT.GROSS SPEC: NORMAL

## 2021-10-18 ENCOUNTER — TELEPHONE (OUTPATIENT)
Dept: GASTROENTEROLOGY | Facility: CLINIC | Age: 66
End: 2021-10-18

## 2021-10-18 NOTE — TELEPHONE ENCOUNTER
----- Message from Mina Osullivan MD sent at 10/15/2021  4:11 PM EDT -----  Let Ms. Jarquin know there was evidence for reflux in the esophagus.  There is also inflammation in the stomach.  She has a very large hiatal hernia.  I ordered an upper GI to assess for a paraesophageal hiatus hernia.  As I discussed after the procedure this may require surgical intervention.

## 2021-11-01 DIAGNOSIS — Z20.822 ENCOUNTER FOR PREPROCEDURE SCREENING LABORATORY TESTING FOR COVID-19: Primary | ICD-10-CM

## 2021-11-01 DIAGNOSIS — Z01.812 ENCOUNTER FOR PREPROCEDURE SCREENING LABORATORY TESTING FOR COVID-19: Primary | ICD-10-CM

## 2021-11-09 ENCOUNTER — LAB (OUTPATIENT)
Dept: PREADMISSION TESTING | Facility: HOSPITAL | Age: 66
End: 2021-11-09

## 2021-11-09 DIAGNOSIS — Z20.822 ENCOUNTER FOR PREPROCEDURE SCREENING LABORATORY TESTING FOR COVID-19: ICD-10-CM

## 2021-11-09 DIAGNOSIS — Z01.812 ENCOUNTER FOR PREPROCEDURE SCREENING LABORATORY TESTING FOR COVID-19: ICD-10-CM

## 2021-11-09 PROCEDURE — U0004 COV-19 TEST NON-CDC HGH THRU: HCPCS | Performed by: NURSE PRACTITIONER

## 2021-11-10 LAB — SARS-COV-2 RNA PNL SPEC NAA+PROBE: NOT DETECTED

## 2021-11-12 ENCOUNTER — HOSPITAL ENCOUNTER (OUTPATIENT)
Dept: GENERAL RADIOLOGY | Facility: HOSPITAL | Age: 66
Discharge: HOME | End: 2021-11-12
Admitting: INTERNAL MEDICINE

## 2021-11-12 DIAGNOSIS — R68.81 EARLY SATIETY: ICD-10-CM

## 2021-11-12 DIAGNOSIS — K44.9 LARGE HIATAL HERNIA: ICD-10-CM

## 2021-11-12 DIAGNOSIS — D50.0 IRON DEFICIENCY ANEMIA DUE TO CHRONIC BLOOD LOSS: ICD-10-CM

## 2021-11-12 DIAGNOSIS — K25.7 CHRONIC CAMERON ULCER: ICD-10-CM

## 2021-11-12 DIAGNOSIS — R10.10 UPPER ABDOMINAL PAIN: ICD-10-CM

## 2021-11-12 PROCEDURE — 74240 X-RAY XM UPR GI TRC 1CNTRST: CPT

## 2021-11-12 RX ADMIN — BARIUM SULFATE 183 ML: 960 POWDER, FOR SUSPENSION ORAL at 10:49

## 2021-11-16 ENCOUNTER — TELEPHONE (OUTPATIENT)
Dept: GASTROENTEROLOGY | Facility: CLINIC | Age: 66
End: 2021-11-16

## 2021-11-16 DIAGNOSIS — K44.9 LARGE HIATAL HERNIA: Primary | ICD-10-CM

## 2021-11-16 DIAGNOSIS — R10.13 EPIGASTRIC PAIN: ICD-10-CM

## 2021-11-16 DIAGNOSIS — K25.9 CAMERON ULCER, UNSPECIFIED ULCER CHRONICITY: ICD-10-CM

## 2021-11-16 NOTE — TELEPHONE ENCOUNTER
I called and spoke with Ms. Jarquin regarding the upper GI study.  She does have a large hiatal hernia and the concern is for paraesophageal component.  She does have upper abdominal discomfort with meals and early fullness.  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  The patient is agreeable to having a consultation with Dr. Tristan for consideration of surgical repair.

## 2021-12-21 ENCOUNTER — TRANSCRIBE ORDERS (OUTPATIENT)
Dept: ADMINISTRATIVE | Facility: HOSPITAL | Age: 66
End: 2021-12-21

## 2021-12-21 DIAGNOSIS — Z01.812 ENCOUNTER FOR PREOPERATIVE SCREENING LABORATORY TESTING FOR COVID-19 VIRUS: ICD-10-CM

## 2021-12-21 DIAGNOSIS — Z20.822 ENCOUNTER FOR PREOPERATIVE SCREENING LABORATORY TESTING FOR COVID-19 VIRUS: ICD-10-CM

## 2021-12-21 DIAGNOSIS — Z11.59 ENCOUNTER FOR SCREENING FOR VIRAL DISEASE: Primary | ICD-10-CM

## 2021-12-21 DIAGNOSIS — K44.9 DIAPHRAGMATIC HERNIA WITHOUT OBSTRUCTION OR GANGRENE: Primary | ICD-10-CM

## 2021-12-28 ENCOUNTER — TELEPHONE (OUTPATIENT)
Dept: ONCOLOGY | Facility: CLINIC | Age: 66
End: 2021-12-28

## 2021-12-28 NOTE — TELEPHONE ENCOUNTER
Caller: Zulema Jarquin    Relationship to patient: Self    Best call back number: [phone]    Chief complaint: HAVING SOME TEST DONE FOR SURGERY NEEDING TO CANCEL APPT WITH LIZETT PAGE 12/29    WILL CALL BACK TO R/S    Type of visit: 3 MONTH F/U AND LABS    Requested date: N/A     If rescheduling, when is the original appointment: WILL CALL BACK TO R/S    Additional notes:        ”

## 2022-01-09 ENCOUNTER — APPOINTMENT (OUTPATIENT)
Dept: PREADMISSION TESTING | Facility: HOSPITAL | Age: 67
End: 2022-01-09

## 2022-01-09 LAB — SARS-COV-2 RNA PNL SPEC NAA+PROBE: NOT DETECTED

## 2022-01-09 PROCEDURE — U0004 COV-19 TEST NON-CDC HGH THRU: HCPCS

## 2022-01-09 PROCEDURE — C9803 HOPD COVID-19 SPEC COLLECT: HCPCS

## 2022-01-11 ENCOUNTER — HOSPITAL ENCOUNTER (OUTPATIENT)
Facility: HOSPITAL | Age: 67
Setting detail: HOSPITAL OUTPATIENT SURGERY
Discharge: HOME | End: 2022-01-11
Attending: SURGERY | Admitting: SURGERY

## 2022-01-11 ENCOUNTER — LAB (OUTPATIENT)
Dept: PREADMISSION TESTING | Facility: HOSPITAL | Age: 67
End: 2022-01-11

## 2022-01-11 DIAGNOSIS — Z01.812 ENCOUNTER FOR PREOPERATIVE SCREENING LABORATORY TESTING FOR COVID-19 VIRUS: ICD-10-CM

## 2022-01-11 DIAGNOSIS — Z20.822 ENCOUNTER FOR PREOPERATIVE SCREENING LABORATORY TESTING FOR COVID-19 VIRUS: ICD-10-CM

## 2022-01-11 LAB — SARS-COV-2 RNA PNL SPEC NAA+PROBE: NOT DETECTED

## 2022-01-11 PROCEDURE — 91010 ESOPHAGUS MOTILITY STUDY: CPT | Performed by: SURGERY

## 2022-01-11 RX ORDER — LIDOCAINE HYDROCHLORIDE 20 MG/ML
SOLUTION OROPHARYNGEAL AS NEEDED
Status: DISCONTINUED | OUTPATIENT
Start: 2022-01-11 | End: 2022-01-11 | Stop reason: HOSPADM

## 2022-01-14 ENCOUNTER — CLINICAL SUPPORT NO REQUIREMENTS (OUTPATIENT)
Dept: PREADMISSION TESTING | Facility: HOSPITAL | Age: 67
End: 2022-01-14

## 2022-01-14 LAB
FLUAV SUBTYP SPEC NAA+PROBE: NOT DETECTED
FLUBV RNA ISLT QL NAA+PROBE: NOT DETECTED
SARS-COV-2 RNA PNL SPEC NAA+PROBE: NOT DETECTED

## 2022-01-14 PROCEDURE — C9803 HOPD COVID-19 SPEC COLLECT: HCPCS

## 2022-01-14 PROCEDURE — 87636 SARSCOV2 & INF A&B AMP PRB: CPT

## 2022-01-23 ENCOUNTER — APPOINTMENT (OUTPATIENT)
Dept: PREADMISSION TESTING | Facility: HOSPITAL | Age: 67
End: 2022-01-23

## 2022-01-23 LAB — SARS-COV-2 RNA PNL SPEC NAA+PROBE: NOT DETECTED

## 2022-01-23 PROCEDURE — U0004 COV-19 TEST NON-CDC HGH THRU: HCPCS | Performed by: SURGERY

## 2022-01-25 ENCOUNTER — HOSPITAL ENCOUNTER (OUTPATIENT)
Dept: GENERAL RADIOLOGY | Facility: HOSPITAL | Age: 67
Discharge: HOME | End: 2022-01-25
Admitting: SURGERY

## 2022-01-25 ENCOUNTER — HOSPITAL ENCOUNTER (OUTPATIENT)
Dept: NUCLEAR MEDICINE | Facility: HOSPITAL | Age: 67
Discharge: HOME | End: 2022-01-25

## 2022-01-25 DIAGNOSIS — K44.9 DIAPHRAGMATIC HERNIA WITHOUT OBSTRUCTION OR GANGRENE: ICD-10-CM

## 2022-01-25 PROCEDURE — 74220 X-RAY XM ESOPHAGUS 1CNTRST: CPT

## 2022-01-25 PROCEDURE — 0 TECHNETIUM SULFUR COLLOID: Performed by: SURGERY

## 2022-01-25 PROCEDURE — 78264 GASTRIC EMPTYING IMG STUDY: CPT

## 2022-01-25 PROCEDURE — A9541 TC99M SULFUR COLLOID: HCPCS | Performed by: SURGERY

## 2022-01-25 RX ADMIN — BARIUM SULFATE 100 ML: 960 POWDER, FOR SUSPENSION ORAL at 10:57

## 2022-01-25 RX ADMIN — TECHNETIUM TC 99M SULFUR COLLOID 1 DOSE: KIT at 08:41

## 2022-05-24 ENCOUNTER — PRE-ADMISSION TESTING (OUTPATIENT)
Dept: PREADMISSION TESTING | Facility: HOSPITAL | Age: 67
End: 2022-05-24

## 2022-05-24 VITALS — HEIGHT: 61 IN | WEIGHT: 162.48 LBS | BODY MASS INDEX: 30.68 KG/M2

## 2022-05-24 LAB
ANION GAP SERPL CALCULATED.3IONS-SCNC: 12 MMOL/L (ref 5–15)
BUN SERPL-MCNC: 13 MG/DL (ref 8–23)
BUN/CREAT SERPL: 19.4 (ref 7–25)
CALCIUM SPEC-SCNC: 8.9 MG/DL (ref 8.6–10.5)
CHLORIDE SERPL-SCNC: 106 MMOL/L (ref 98–107)
CO2 SERPL-SCNC: 22 MMOL/L (ref 22–29)
CREAT SERPL-MCNC: 0.67 MG/DL (ref 0.57–1)
DEPRECATED RDW RBC AUTO: 46.4 FL (ref 37–54)
EGFRCR SERPLBLD CKD-EPI 2021: 95.3 ML/MIN/1.73
ERYTHROCYTE [DISTWIDTH] IN BLOOD BY AUTOMATED COUNT: 19.9 % (ref 12.3–15.4)
GLUCOSE SERPL-MCNC: 105 MG/DL (ref 65–99)
HBA1C MFR BLD: 4.8 % (ref 4.8–5.6)
HCT VFR BLD AUTO: 23.4 % (ref 34–46.6)
HGB BLD-MCNC: 6.3 G/DL (ref 12–15.9)
MCH RBC QN AUTO: 17.8 PG (ref 26.6–33)
MCHC RBC AUTO-ENTMCNC: 26.9 G/DL (ref 31.5–35.7)
MCV RBC AUTO: 66.1 FL (ref 79–97)
PLATELET # BLD AUTO: 328 10*3/MM3 (ref 140–450)
PMV BLD AUTO: 9.2 FL (ref 6–12)
POTASSIUM SERPL-SCNC: 3.8 MMOL/L (ref 3.5–5.2)
QT INTERVAL: 392 MS
QTC INTERVAL: 482 MS
RBC # BLD AUTO: 3.54 10*6/MM3 (ref 3.77–5.28)
SODIUM SERPL-SCNC: 140 MMOL/L (ref 136–145)
WBC NRBC COR # BLD: 4.42 10*3/MM3 (ref 3.4–10.8)

## 2022-05-24 PROCEDURE — 85027 COMPLETE CBC AUTOMATED: CPT

## 2022-05-24 PROCEDURE — 80048 BASIC METABOLIC PNL TOTAL CA: CPT

## 2022-05-24 PROCEDURE — 93005 ELECTROCARDIOGRAM TRACING: CPT

## 2022-05-24 PROCEDURE — 36415 COLL VENOUS BLD VENIPUNCTURE: CPT

## 2022-05-24 PROCEDURE — 83036 HEMOGLOBIN GLYCOSYLATED A1C: CPT

## 2022-05-24 PROCEDURE — 93010 ELECTROCARDIOGRAM REPORT: CPT | Performed by: INTERNAL MEDICINE

## 2022-05-24 NOTE — PAT
Patient to apply Chlorhexadine wipes  to surgical area (as instructed) the night before procedure and the AM of procedure. Wipes provided.    Patient viewed general PAT education video as instructed in their preoperative information received from their surgeon.  Patient stated the general PAT education video was viewed in its entirety and survey completed.  Copies of PAT general education handouts (Incentive Spirometry, Meds to Beds Program, Patient Belongings, Pre-op skin preparation instructions, Blood Glucose testing, Visitor policy, Surgery FAQ, Code H) distributed to patient if not printed. Education related to the PAT pass and skin preparation for surgery (if applicable) completed in PAT as a reinforcement to PAT education video. Patient instructed to return PAT pass provided today as well as completed skin preparation sheet (if applicable) on the day of procedure.     Additionally if patient had not viewed video yet but intended to view it at home or in our waiting area, then referred them to the handout with QR code/link provided during PAT visit.  Instructed patient to complete survey after viewing the video in its entirety.  Encouraged patient/family to read PAT general education handouts thoroughly and notify PAT staff with any questions or concerns. Patient verbalized understanding of all information and priority content.

## 2022-05-27 ENCOUNTER — ANESTHESIA EVENT (OUTPATIENT)
Dept: PERIOP | Facility: HOSPITAL | Age: 67
End: 2022-05-27

## 2022-05-27 RX ORDER — SODIUM CHLORIDE 0.9 % (FLUSH) 0.9 %
10 SYRINGE (ML) INJECTION AS NEEDED
Status: CANCELLED | OUTPATIENT
Start: 2022-05-27

## 2022-05-27 RX ORDER — SODIUM CHLORIDE, SODIUM LACTATE, POTASSIUM CHLORIDE, CALCIUM CHLORIDE 600; 310; 30; 20 MG/100ML; MG/100ML; MG/100ML; MG/100ML
9 INJECTION, SOLUTION INTRAVENOUS CONTINUOUS
Status: CANCELLED | OUTPATIENT
Start: 2022-05-27

## 2022-05-27 RX ORDER — LIDOCAINE HYDROCHLORIDE 10 MG/ML
0.5 INJECTION, SOLUTION EPIDURAL; INFILTRATION; INTRACAUDAL; PERINEURAL ONCE AS NEEDED
Status: CANCELLED | OUTPATIENT
Start: 2022-05-27

## 2022-05-27 RX ORDER — FAMOTIDINE 10 MG/ML
20 INJECTION, SOLUTION INTRAVENOUS ONCE
Status: CANCELLED | OUTPATIENT
Start: 2022-05-27 | End: 2022-05-27

## 2022-05-27 RX ORDER — SODIUM CHLORIDE 0.9 % (FLUSH) 0.9 %
10 SYRINGE (ML) INJECTION EVERY 12 HOURS SCHEDULED
Status: CANCELLED | OUTPATIENT
Start: 2022-05-27

## 2022-05-29 ENCOUNTER — CLINICAL SUPPORT NO REQUIREMENTS (OUTPATIENT)
Dept: PREADMISSION TESTING | Facility: HOSPITAL | Age: 67
End: 2022-05-29

## 2022-05-29 LAB — SARS-COV-2 RNA PNL SPEC NAA+PROBE: DETECTED

## 2022-05-29 PROCEDURE — C9803 HOPD COVID-19 SPEC COLLECT: HCPCS

## 2022-05-29 PROCEDURE — U0004 COV-19 TEST NON-CDC HGH THRU: HCPCS

## 2022-05-31 ENCOUNTER — ANESTHESIA (OUTPATIENT)
Dept: PERIOP | Facility: HOSPITAL | Age: 67
End: 2022-05-31

## 2022-06-27 ENCOUNTER — PRE-ADMISSION TESTING (OUTPATIENT)
Dept: PREADMISSION TESTING | Facility: HOSPITAL | Age: 67
End: 2022-06-27

## 2022-06-27 VITALS — WEIGHT: 160.5 LBS | BODY MASS INDEX: 30.3 KG/M2 | HEIGHT: 61 IN

## 2022-06-27 DIAGNOSIS — Z01.812 ENCOUNTER FOR PREOPERATIVE SCREENING LABORATORY TESTING FOR COVID-19 VIRUS: Primary | ICD-10-CM

## 2022-06-27 DIAGNOSIS — Z20.822 ENCOUNTER FOR PREOPERATIVE SCREENING LABORATORY TESTING FOR COVID-19 VIRUS: Primary | ICD-10-CM

## 2022-06-27 LAB
ABO GROUP BLD: NORMAL
ANION GAP SERPL CALCULATED.3IONS-SCNC: 10 MMOL/L (ref 5–15)
BLD GP AB SCN SERPL QL: NEGATIVE
BUN SERPL-MCNC: 15 MG/DL (ref 8–23)
BUN/CREAT SERPL: 18.5 (ref 7–25)
CALCIUM SPEC-SCNC: 9.1 MG/DL (ref 8.6–10.5)
CHLORIDE SERPL-SCNC: 108 MMOL/L (ref 98–107)
CO2 SERPL-SCNC: 26 MMOL/L (ref 22–29)
CREAT SERPL-MCNC: 0.81 MG/DL (ref 0.57–1)
DEPRECATED RDW RBC AUTO: 49.7 FL (ref 37–54)
EGFRCR SERPLBLD CKD-EPI 2021: 79.2 ML/MIN/1.73
ERYTHROCYTE [DISTWIDTH] IN BLOOD BY AUTOMATED COUNT: 20.2 % (ref 12.3–15.4)
GLUCOSE SERPL-MCNC: 91 MG/DL (ref 65–99)
HCT VFR BLD AUTO: 26.3 % (ref 34–46.6)
HGB BLD-MCNC: 6.8 G/DL (ref 12–15.9)
MCH RBC QN AUTO: 17.7 PG (ref 26.6–33)
MCHC RBC AUTO-ENTMCNC: 25.9 G/DL (ref 31.5–35.7)
MCV RBC AUTO: 68.5 FL (ref 79–97)
PLATELET # BLD AUTO: 342 10*3/MM3 (ref 140–450)
PMV BLD AUTO: 9.4 FL (ref 6–12)
POTASSIUM SERPL-SCNC: 3.8 MMOL/L (ref 3.5–5.2)
RBC # BLD AUTO: 3.84 10*6/MM3 (ref 3.77–5.28)
RH BLD: POSITIVE
SODIUM SERPL-SCNC: 144 MMOL/L (ref 136–145)
T&S EXPIRATION DATE: NORMAL
WBC NRBC COR # BLD: 6.5 10*3/MM3 (ref 3.4–10.8)

## 2022-06-27 PROCEDURE — 86923 COMPATIBILITY TEST ELECTRIC: CPT

## 2022-06-27 PROCEDURE — 86901 BLOOD TYPING SEROLOGIC RH(D): CPT | Performed by: SURGERY

## 2022-06-27 PROCEDURE — 85027 COMPLETE CBC AUTOMATED: CPT | Performed by: SURGERY

## 2022-06-27 PROCEDURE — 36415 COLL VENOUS BLD VENIPUNCTURE: CPT

## 2022-06-27 PROCEDURE — 86900 BLOOD TYPING SEROLOGIC ABO: CPT | Performed by: SURGERY

## 2022-06-27 PROCEDURE — 86850 RBC ANTIBODY SCREEN: CPT | Performed by: SURGERY

## 2022-06-27 PROCEDURE — 80048 BASIC METABOLIC PNL TOTAL CA: CPT | Performed by: SURGERY

## 2022-06-27 RX ORDER — SODIUM CHLORIDE 0.9 % (FLUSH) 0.9 %
10 SYRINGE (ML) INJECTION EVERY 12 HOURS SCHEDULED
Status: CANCELLED | OUTPATIENT
Start: 2022-06-27

## 2022-06-27 NOTE — PAT
NO COVID IN PAT SINCE PT TESTED POSITIVE FOR COVID 5/29/22 AT [facility]- RESULT ON CHART.     EKG ON CHART FROM 5/24/22.     CRITICAL CALLED FROM LAB [facility] 6.8- PAGED ON CALL FOR JONNATHAN- DR REED CALLED BACK IMMEDIATELY AND STATES HE WILL INFORMED DR DE SANTIAGO. NO NEW ORDERS AT THIS TIME.     BLOOD TO BE TRANSFUSED IN OR PER PAPER ORDERS.    Patient viewed general PAT education video as instructed in their preoperative information received from their surgeon.  Patient stated the general PAT education video was viewed in its entirety and survey completed.  Copies of PAT general education handouts (Incentive Spirometry, Meds to Beds Program, Patient Belongings, Pre-op skin preparation instructions, Blood Glucose testing, Visitor policy, Surgery FAQ, Code H) distributed to patient if not printed. Education related to the PAT pass and skin preparation for surgery (if applicable) completed in PAT as a reinforcement to PAT education video. Patient instructed to return PAT pass provided today as well as completed skin preparation sheet (if applicable) on the day of procedure.     Additionally if patient had not viewed video yet but intended to view it at home or in our waiting area, then referred them to the handout with QR code/link provided during PAT visit.  Instructed patient to complete survey after viewing the video in its entirety.  Encouraged patient/family to read PAT general education handouts thoroughly and notify PAT staff with any questions or concerns. Patient verbalized understanding of all information and priority content.- PT AWARE SINCE GOT CANCELED BEFORE SINCE WAS COVID POSITIVE. - PT ALREADY HAS CHG WIPES AND INFORMATION ON HOW TO USE ALSO.     Patient to apply Chlorhexadine wipes  to surgical area (as instructed) the night before procedure and the AM of procedure. Wipes provided.

## 2022-06-28 ENCOUNTER — HOSPITAL ENCOUNTER (OUTPATIENT)
Facility: HOSPITAL | Age: 67
Discharge: HOME | End: 2022-07-01
Attending: SURGERY | Admitting: SURGERY

## 2022-06-28 DIAGNOSIS — K44.9 HERNIA, HIATAL: Primary | Chronic | ICD-10-CM

## 2022-06-28 DIAGNOSIS — K44.9 PARAESOPHAGEAL HERNIA: ICD-10-CM

## 2022-06-28 PROCEDURE — 25010000002 CEFAZOLIN IN DEXTROSE 2-4 GM/100ML-% SOLUTION: Performed by: SURGERY

## 2022-06-28 PROCEDURE — 25010000002 FENTANYL CITRATE (PF) 50 MCG/ML SOLUTION: Performed by: NURSE ANESTHETIST, CERTIFIED REGISTERED

## 2022-06-28 PROCEDURE — 25010000002 ONDANSETRON PER 1 MG: Performed by: SURGERY

## 2022-06-28 PROCEDURE — 83036 HEMOGLOBIN GLYCOSYLATED A1C: CPT | Performed by: SURGERY

## 2022-06-28 PROCEDURE — 25010000002 FENTANYL CITRATE (PF) 50 MCG/ML SOLUTION

## 2022-06-28 PROCEDURE — C1781 MESH (IMPLANTABLE): HCPCS | Performed by: SURGERY

## 2022-06-28 PROCEDURE — 25010000002 NEOSTIGMINE 10 MG/10ML SOLUTION: Performed by: NURSE ANESTHETIST, CERTIFIED REGISTERED

## 2022-06-28 PROCEDURE — G0378 HOSPITAL OBSERVATION PER HR: HCPCS

## 2022-06-28 PROCEDURE — P9016 RBC LEUKOCYTES REDUCED: HCPCS

## 2022-06-28 PROCEDURE — 86900 BLOOD TYPING SEROLOGIC ABO: CPT

## 2022-06-28 PROCEDURE — 25010000002 DEXAMETHASONE PER 1 MG: Performed by: NURSE ANESTHETIST, CERTIFIED REGISTERED

## 2022-06-28 PROCEDURE — 25010000002 PROPOFOL 10 MG/ML EMULSION: Performed by: NURSE ANESTHETIST, CERTIFIED REGISTERED

## 2022-06-28 PROCEDURE — 43246 EGD PLACE GASTROSTOMY TUBE: CPT | Performed by: PHYSICIAN ASSISTANT

## 2022-06-28 PROCEDURE — 43282 LAP PARAESOPH HER RPR W/MESH: CPT | Performed by: PHYSICIAN ASSISTANT

## 2022-06-28 PROCEDURE — 25010000002 ONDANSETRON PER 1 MG: Performed by: NURSE ANESTHETIST, CERTIFIED REGISTERED

## 2022-06-28 PROCEDURE — 36430 TRANSFUSION BLD/BLD COMPNT: CPT

## 2022-06-28 PROCEDURE — 25010000002 HYDROMORPHONE HCL PF 500 MG/50ML SOLUTION

## 2022-06-28 PROCEDURE — 88302 TISSUE EXAM BY PATHOLOGIST: CPT | Performed by: SURGERY

## 2022-06-28 DEVICE — PHASIX ST MESH, RECTANGLE
Type: IMPLANTABLE DEVICE | Site: ESOPHAGUS | Status: FUNCTIONAL
Brand: PHASIX ST MESH

## 2022-06-28 DEVICE — LIGACLIP 10-M/L, 10MM ENDOSCOPIC ROTATING MULTIPLE CLIP APPLIERS
Type: IMPLANTABLE DEVICE | Site: ABDOMEN | Status: FUNCTIONAL
Brand: LIGACLIP

## 2022-06-28 RX ORDER — DEXAMETHASONE SODIUM PHOSPHATE 10 MG/ML
INJECTION INTRAMUSCULAR; INTRAVENOUS AS NEEDED
Status: DISCONTINUED | OUTPATIENT
Start: 2022-06-28 | End: 2022-06-28 | Stop reason: SURG

## 2022-06-28 RX ORDER — SODIUM CHLORIDE 9 MG/ML
INJECTION, SOLUTION INTRAVENOUS CONTINUOUS PRN
Status: DISCONTINUED | OUTPATIENT
Start: 2022-06-28 | End: 2022-06-28 | Stop reason: SURG

## 2022-06-28 RX ORDER — FENTANYL CITRATE 50 UG/ML
INJECTION, SOLUTION INTRAMUSCULAR; INTRAVENOUS AS NEEDED
Status: DISCONTINUED | OUTPATIENT
Start: 2022-06-28 | End: 2022-06-28 | Stop reason: SURG

## 2022-06-28 RX ORDER — SODIUM CHLORIDE, SODIUM LACTATE, POTASSIUM CHLORIDE, CALCIUM CHLORIDE 600; 310; 30; 20 MG/100ML; MG/100ML; MG/100ML; MG/100ML
125 INJECTION, SOLUTION INTRAVENOUS CONTINUOUS
Status: DISCONTINUED | OUTPATIENT
Start: 2022-06-28 | End: 2022-06-29

## 2022-06-28 RX ORDER — PROMETHAZINE HYDROCHLORIDE 25 MG/1
25 TABLET ORAL ONCE AS NEEDED
Status: DISCONTINUED | OUTPATIENT
Start: 2022-06-28 | End: 2022-06-28 | Stop reason: HOSPADM

## 2022-06-28 RX ORDER — ENALAPRILAT 2.5 MG/2ML
1.25 INJECTION INTRAVENOUS EVERY 6 HOURS PRN
Status: DISCONTINUED | OUTPATIENT
Start: 2022-06-28 | End: 2022-07-01 | Stop reason: HOSPADM

## 2022-06-28 RX ORDER — ONDANSETRON 4 MG/1
4 TABLET, FILM COATED ORAL EVERY 6 HOURS PRN
Status: DISCONTINUED | OUTPATIENT
Start: 2022-06-28 | End: 2022-07-01 | Stop reason: HOSPADM

## 2022-06-28 RX ORDER — DIPHENHYDRAMINE HYDROCHLORIDE 50 MG/ML
25 INJECTION INTRAMUSCULAR; INTRAVENOUS EVERY 6 HOURS PRN
Status: DISCONTINUED | OUTPATIENT
Start: 2022-06-28 | End: 2022-07-01 | Stop reason: HOSPADM

## 2022-06-28 RX ORDER — FENTANYL CITRATE 50 UG/ML
INJECTION, SOLUTION INTRAMUSCULAR; INTRAVENOUS
Status: COMPLETED
Start: 2022-06-28 | End: 2022-06-28

## 2022-06-28 RX ORDER — ONDANSETRON 2 MG/ML
4 INJECTION INTRAMUSCULAR; INTRAVENOUS EVERY 6 HOURS PRN
Status: DISCONTINUED | OUTPATIENT
Start: 2022-06-28 | End: 2022-07-01 | Stop reason: HOSPADM

## 2022-06-28 RX ORDER — SODIUM CHLORIDE, SODIUM LACTATE, POTASSIUM CHLORIDE, CALCIUM CHLORIDE 600; 310; 30; 20 MG/100ML; MG/100ML; MG/100ML; MG/100ML
9 INJECTION, SOLUTION INTRAVENOUS CONTINUOUS
Status: DISCONTINUED | OUTPATIENT
Start: 2022-06-28 | End: 2022-06-28

## 2022-06-28 RX ORDER — LABETALOL HYDROCHLORIDE 5 MG/ML
5 INJECTION, SOLUTION INTRAVENOUS
Status: DISCONTINUED | OUTPATIENT
Start: 2022-06-28 | End: 2022-06-28 | Stop reason: HOSPADM

## 2022-06-28 RX ORDER — GLYCOPYRROLATE 0.2 MG/ML
INJECTION INTRAMUSCULAR; INTRAVENOUS AS NEEDED
Status: DISCONTINUED | OUTPATIENT
Start: 2022-06-28 | End: 2022-06-28 | Stop reason: SURG

## 2022-06-28 RX ORDER — NEOSTIGMINE METHYLSULFATE 1 MG/ML
INJECTION, SOLUTION INTRAVENOUS AS NEEDED
Status: DISCONTINUED | OUTPATIENT
Start: 2022-06-28 | End: 2022-06-28 | Stop reason: SURG

## 2022-06-28 RX ORDER — SODIUM CHLORIDE, SODIUM LACTATE, POTASSIUM CHLORIDE, CALCIUM CHLORIDE 600; 310; 30; 20 MG/100ML; MG/100ML; MG/100ML; MG/100ML
INJECTION, SOLUTION INTRAVENOUS CONTINUOUS PRN
Status: DISCONTINUED | OUTPATIENT
Start: 2022-06-28 | End: 2022-06-28 | Stop reason: SURG

## 2022-06-28 RX ORDER — SODIUM CHLORIDE 0.9 % (FLUSH) 0.9 %
3-10 SYRINGE (ML) INJECTION AS NEEDED
Status: DISCONTINUED | OUTPATIENT
Start: 2022-06-28 | End: 2022-06-28 | Stop reason: HOSPADM

## 2022-06-28 RX ORDER — SODIUM CHLORIDE 0.9 % (FLUSH) 0.9 %
3 SYRINGE (ML) INJECTION EVERY 12 HOURS SCHEDULED
Status: DISCONTINUED | OUTPATIENT
Start: 2022-06-28 | End: 2022-06-28 | Stop reason: HOSPADM

## 2022-06-28 RX ORDER — HYDRALAZINE HYDROCHLORIDE 20 MG/ML
5 INJECTION INTRAMUSCULAR; INTRAVENOUS
Status: DISCONTINUED | OUTPATIENT
Start: 2022-06-28 | End: 2022-06-28 | Stop reason: HOSPADM

## 2022-06-28 RX ORDER — FAMOTIDINE 20 MG/1
20 TABLET, FILM COATED ORAL ONCE
Status: COMPLETED | OUTPATIENT
Start: 2022-06-28 | End: 2022-06-28

## 2022-06-28 RX ORDER — PROMETHAZINE HYDROCHLORIDE 25 MG/1
25 SUPPOSITORY RECTAL ONCE AS NEEDED
Status: DISCONTINUED | OUTPATIENT
Start: 2022-06-28 | End: 2022-06-28 | Stop reason: HOSPADM

## 2022-06-28 RX ORDER — NALOXONE HCL 0.4 MG/ML
0.1 VIAL (ML) INJECTION
Status: DISCONTINUED | OUTPATIENT
Start: 2022-06-28 | End: 2022-07-01 | Stop reason: HOSPADM

## 2022-06-28 RX ORDER — SIMETHICONE 80 MG
80 TABLET,CHEWABLE ORAL 4 TIMES DAILY PRN
Status: DISCONTINUED | OUTPATIENT
Start: 2022-06-28 | End: 2022-07-01 | Stop reason: HOSPADM

## 2022-06-28 RX ORDER — HYDROCODONE BITARTRATE AND ACETAMINOPHEN 5; 325 MG/1; MG/1
1 TABLET ORAL ONCE AS NEEDED
Status: DISCONTINUED | OUTPATIENT
Start: 2022-06-28 | End: 2022-06-28 | Stop reason: HOSPADM

## 2022-06-28 RX ORDER — BUPIVACAINE HCL/0.9 % NACL/PF 0.125 %
PLASTIC BAG, INJECTION (ML) EPIDURAL AS NEEDED
Status: DISCONTINUED | OUTPATIENT
Start: 2022-06-28 | End: 2022-06-28 | Stop reason: SURG

## 2022-06-28 RX ORDER — CEFAZOLIN SODIUM 2 G/100ML
2 INJECTION, SOLUTION INTRAVENOUS ONCE
Status: COMPLETED | OUTPATIENT
Start: 2022-06-28 | End: 2022-06-28

## 2022-06-28 RX ORDER — PANTOPRAZOLE SODIUM 40 MG/1
40 TABLET, DELAYED RELEASE ORAL EVERY MORNING
Status: DISCONTINUED | OUTPATIENT
Start: 2022-06-29 | End: 2022-07-01 | Stop reason: HOSPADM

## 2022-06-28 RX ORDER — NALOXONE HCL 0.4 MG/ML
0.4 VIAL (ML) INJECTION AS NEEDED
Status: DISCONTINUED | OUTPATIENT
Start: 2022-06-28 | End: 2022-06-28 | Stop reason: HOSPADM

## 2022-06-28 RX ORDER — MEPERIDINE HYDROCHLORIDE 25 MG/ML
12.5 INJECTION INTRAMUSCULAR; INTRAVENOUS; SUBCUTANEOUS
Status: DISCONTINUED | OUTPATIENT
Start: 2022-06-28 | End: 2022-06-28 | Stop reason: HOSPADM

## 2022-06-28 RX ORDER — ONDANSETRON 2 MG/ML
INJECTION INTRAMUSCULAR; INTRAVENOUS AS NEEDED
Status: DISCONTINUED | OUTPATIENT
Start: 2022-06-28 | End: 2022-06-28 | Stop reason: SURG

## 2022-06-28 RX ORDER — ONDANSETRON 2 MG/ML
4 INJECTION INTRAMUSCULAR; INTRAVENOUS ONCE AS NEEDED
Status: DISCONTINUED | OUTPATIENT
Start: 2022-06-28 | End: 2022-06-28 | Stop reason: HOSPADM

## 2022-06-28 RX ORDER — MIDAZOLAM HYDROCHLORIDE 1 MG/ML
0.5 INJECTION INTRAMUSCULAR; INTRAVENOUS
Status: DISCONTINUED | OUTPATIENT
Start: 2022-06-28 | End: 2022-06-28 | Stop reason: SDUPTHER

## 2022-06-28 RX ORDER — LIDOCAINE HYDROCHLORIDE 10 MG/ML
0.5 INJECTION, SOLUTION EPIDURAL; INFILTRATION; INTRACAUDAL; PERINEURAL ONCE AS NEEDED
Status: COMPLETED | OUTPATIENT
Start: 2022-06-28 | End: 2022-06-28

## 2022-06-28 RX ORDER — MIDAZOLAM HYDROCHLORIDE 1 MG/ML
0.5 INJECTION INTRAMUSCULAR; INTRAVENOUS
Status: DISCONTINUED | OUTPATIENT
Start: 2022-06-28 | End: 2022-06-28 | Stop reason: HOSPADM

## 2022-06-28 RX ORDER — PROMETHAZINE HYDROCHLORIDE 6.25 MG/5ML
12.5 SYRUP ORAL EVERY 6 HOURS PRN
Status: DISCONTINUED | OUTPATIENT
Start: 2022-06-28 | End: 2022-07-01 | Stop reason: HOSPADM

## 2022-06-28 RX ORDER — MAGNESIUM HYDROXIDE 1200 MG/15ML
LIQUID ORAL AS NEEDED
Status: DISCONTINUED | OUTPATIENT
Start: 2022-06-28 | End: 2022-06-28 | Stop reason: HOSPADM

## 2022-06-28 RX ORDER — FENTANYL CITRATE 50 UG/ML
50 INJECTION, SOLUTION INTRAMUSCULAR; INTRAVENOUS
Status: DISCONTINUED | OUTPATIENT
Start: 2022-06-28 | End: 2022-06-28 | Stop reason: HOSPADM

## 2022-06-28 RX ORDER — LIDOCAINE HYDROCHLORIDE 20 MG/ML
INJECTION, SOLUTION INFILTRATION; PERINEURAL AS NEEDED
Status: DISCONTINUED | OUTPATIENT
Start: 2022-06-28 | End: 2022-06-28 | Stop reason: SURG

## 2022-06-28 RX ORDER — IPRATROPIUM BROMIDE AND ALBUTEROL SULFATE 2.5; .5 MG/3ML; MG/3ML
3 SOLUTION RESPIRATORY (INHALATION) ONCE AS NEEDED
Status: DISCONTINUED | OUTPATIENT
Start: 2022-06-28 | End: 2022-06-28 | Stop reason: HOSPADM

## 2022-06-28 RX ORDER — HYDROMORPHONE HYDROCHLORIDE 1 MG/ML
0.5 INJECTION, SOLUTION INTRAMUSCULAR; INTRAVENOUS; SUBCUTANEOUS
Status: DISCONTINUED | OUTPATIENT
Start: 2022-06-28 | End: 2022-06-28 | Stop reason: HOSPADM

## 2022-06-28 RX ORDER — ROCURONIUM BROMIDE 10 MG/ML
INJECTION, SOLUTION INTRAVENOUS AS NEEDED
Status: DISCONTINUED | OUTPATIENT
Start: 2022-06-28 | End: 2022-06-28 | Stop reason: SURG

## 2022-06-28 RX ORDER — DROPERIDOL 2.5 MG/ML
0.62 INJECTION, SOLUTION INTRAMUSCULAR; INTRAVENOUS ONCE AS NEEDED
Status: DISCONTINUED | OUTPATIENT
Start: 2022-06-28 | End: 2022-06-28 | Stop reason: HOSPADM

## 2022-06-28 RX ORDER — BUPIVACAINE HYDROCHLORIDE AND EPINEPHRINE 2.5; 5 MG/ML; UG/ML
INJECTION, SOLUTION EPIDURAL; INFILTRATION; INTRACAUDAL; PERINEURAL AS NEEDED
Status: DISCONTINUED | OUTPATIENT
Start: 2022-06-28 | End: 2022-06-28 | Stop reason: HOSPADM

## 2022-06-28 RX ORDER — DROPERIDOL 2.5 MG/ML
0.62 INJECTION, SOLUTION INTRAMUSCULAR; INTRAVENOUS
Status: DISCONTINUED | OUTPATIENT
Start: 2022-06-28 | End: 2022-06-28 | Stop reason: HOSPADM

## 2022-06-28 RX ORDER — PROPOFOL 10 MG/ML
VIAL (ML) INTRAVENOUS AS NEEDED
Status: DISCONTINUED | OUTPATIENT
Start: 2022-06-28 | End: 2022-06-28 | Stop reason: SURG

## 2022-06-28 RX ORDER — DOCUSATE SODIUM 50 MG/5 ML
100 LIQUID (ML) ORAL DAILY
Status: DISCONTINUED | OUTPATIENT
Start: 2022-06-28 | End: 2022-07-01 | Stop reason: HOSPADM

## 2022-06-28 RX ORDER — HEPARIN SODIUM 5000 [USP'U]/ML
5000 INJECTION, SOLUTION INTRAVENOUS; SUBCUTANEOUS EVERY 8 HOURS SCHEDULED
Status: DISCONTINUED | OUTPATIENT
Start: 2022-06-29 | End: 2022-07-01 | Stop reason: HOSPADM

## 2022-06-28 RX ORDER — SODIUM CHLORIDE 0.9 % (FLUSH) 0.9 %
10 SYRINGE (ML) INJECTION AS NEEDED
Status: DISCONTINUED | OUTPATIENT
Start: 2022-06-28 | End: 2022-06-28 | Stop reason: HOSPADM

## 2022-06-28 RX ADMIN — FENTANYL CITRATE 50 MCG: 50 INJECTION, SOLUTION INTRAMUSCULAR; INTRAVENOUS at 12:37

## 2022-06-28 RX ADMIN — FENTANYL CITRATE 50 MCG: 50 INJECTION, SOLUTION INTRAMUSCULAR; INTRAVENOUS at 10:15

## 2022-06-28 RX ADMIN — LIDOCAINE HYDROCHLORIDE 80 MG: 20 INJECTION, SOLUTION INFILTRATION; PERINEURAL at 10:13

## 2022-06-28 RX ADMIN — PROPOFOL 25 MCG/KG/MIN: 10 INJECTION, EMULSION INTRAVENOUS at 10:24

## 2022-06-28 RX ADMIN — SIMETHICONE 80 MG: 80 TABLET, CHEWABLE ORAL at 20:59

## 2022-06-28 RX ADMIN — Medication 100 MCG: at 10:15

## 2022-06-28 RX ADMIN — FENTANYL CITRATE 50 MCG: 50 INJECTION, SOLUTION INTRAMUSCULAR; INTRAVENOUS at 12:27

## 2022-06-28 RX ADMIN — PROPOFOL 150 MG: 10 INJECTION, EMULSION INTRAVENOUS at 10:13

## 2022-06-28 RX ADMIN — FENTANYL CITRATE 50 MCG: 50 INJECTION, SOLUTION INTRAMUSCULAR; INTRAVENOUS at 10:13

## 2022-06-28 RX ADMIN — DOCUSATE SODIUM 100 MG: 50 LIQUID ORAL at 14:29

## 2022-06-28 RX ADMIN — ROCURONIUM BROMIDE 20 MG: 10 INJECTION, SOLUTION INTRAVENOUS at 11:14

## 2022-06-28 RX ADMIN — HYDROCODONE BITARTRATE AND ACETAMINOPHEN 15 ML: 7.5; 325 SOLUTION ORAL at 20:16

## 2022-06-28 RX ADMIN — LIDOCAINE HYDROCHLORIDE 0.5 ML: 10 INJECTION, SOLUTION EPIDURAL; INFILTRATION; INTRACAUDAL; PERINEURAL at 09:57

## 2022-06-28 RX ADMIN — HYDROMORPHONE HYDROCHLORIDE: 10 INJECTION INTRAMUSCULAR; INTRAVENOUS; SUBCUTANEOUS at 13:20

## 2022-06-28 RX ADMIN — CEFAZOLIN SODIUM 2 G: 2 INJECTION, SOLUTION INTRAVENOUS at 10:18

## 2022-06-28 RX ADMIN — Medication 100 MCG: at 10:14

## 2022-06-28 RX ADMIN — Medication 5000 UNITS: at 14:29

## 2022-06-28 RX ADMIN — ROCURONIUM BROMIDE 50 MG: 10 INJECTION, SOLUTION INTRAVENOUS at 10:14

## 2022-06-28 RX ADMIN — SODIUM CHLORIDE, POTASSIUM CHLORIDE, SODIUM LACTATE AND CALCIUM CHLORIDE 125 ML/HR: 600; 310; 30; 20 INJECTION, SOLUTION INTRAVENOUS at 22:17

## 2022-06-28 RX ADMIN — SODIUM CHLORIDE, POTASSIUM CHLORIDE, SODIUM LACTATE AND CALCIUM CHLORIDE 125 ML/HR: 600; 310; 30; 20 INJECTION, SOLUTION INTRAVENOUS at 14:40

## 2022-06-28 RX ADMIN — SIMETHICONE 80 MG: 80 TABLET, CHEWABLE ORAL at 15:15

## 2022-06-28 RX ADMIN — FENTANYL CITRATE 50 MCG: 50 INJECTION, SOLUTION INTRAMUSCULAR; INTRAVENOUS at 11:58

## 2022-06-28 RX ADMIN — DEXAMETHASONE SODIUM PHOSPHATE 8 MG: 10 INJECTION INTRAMUSCULAR; INTRAVENOUS at 10:27

## 2022-06-28 RX ADMIN — NEOSTIGMINE METHYLSULFATE 3 MG: 0.5 INJECTION INTRAVENOUS at 11:55

## 2022-06-28 RX ADMIN — SODIUM CHLORIDE, POTASSIUM CHLORIDE, SODIUM LACTATE AND CALCIUM CHLORIDE: 600; 310; 30; 20 INJECTION, SOLUTION INTRAVENOUS at 10:17

## 2022-06-28 RX ADMIN — Medication 100 MCG: at 10:30

## 2022-06-28 RX ADMIN — HYDROCODONE BITARTRATE AND ACETAMINOPHEN 15 ML: 7.5; 325 SOLUTION ORAL at 14:29

## 2022-06-28 RX ADMIN — Medication: at 13:20

## 2022-06-28 RX ADMIN — ONDANSETRON 4 MG: 2 INJECTION INTRAMUSCULAR; INTRAVENOUS at 14:40

## 2022-06-28 RX ADMIN — FENTANYL CITRATE 50 MCG: 50 INJECTION INTRAMUSCULAR; INTRAVENOUS at 13:15

## 2022-06-28 RX ADMIN — FAMOTIDINE 20 MG: 20 TABLET ORAL at 09:57

## 2022-06-28 RX ADMIN — ONDANSETRON 4 MG: 2 INJECTION INTRAMUSCULAR; INTRAVENOUS at 11:55

## 2022-06-28 RX ADMIN — Medication 100 MCG: at 10:27

## 2022-06-28 RX ADMIN — SODIUM CHLORIDE, POTASSIUM CHLORIDE, SODIUM LACTATE AND CALCIUM CHLORIDE 9 ML/HR: 600; 310; 30; 20 INJECTION, SOLUTION INTRAVENOUS at 09:56

## 2022-06-28 RX ADMIN — FENTANYL CITRATE 50 MCG: 50 INJECTION, SOLUTION INTRAMUSCULAR; INTRAVENOUS at 10:45

## 2022-06-28 RX ADMIN — SODIUM CHLORIDE: 9 INJECTION, SOLUTION INTRAVENOUS at 10:19

## 2022-06-28 RX ADMIN — FENTANYL CITRATE 50 MCG: 50 INJECTION, SOLUTION INTRAMUSCULAR; INTRAVENOUS at 13:15

## 2022-06-28 RX ADMIN — GLYCOPYRROLATE 0.4 MCG: 0.2 INJECTION INTRAMUSCULAR; INTRAVENOUS at 11:55

## 2022-06-28 NOTE — ANESTHESIA PREPROCEDURE EVALUATION
Anesthesia Evaluation     Patient summary reviewed and Nursing notes reviewed   history of anesthetic complications: PONV  NPO Solid Status: > 8 hours  NPO Liquid Status: > 8 hours           Airway   Mallampati: III  TM distance: >3 FB  Neck ROM: limited  Possible difficult intubation  Dental      Pulmonary    (-) asthma, shortness of breath, recent URI, sleep apnea, not a smoker  Cardiovascular     ECG reviewed    (+) hypertension, valvular problems/murmurs (no B Blocker now), dysrhythmias (assoc c MVP  no b blocker now) Tachycardia,   (-) past MI, CAD, angina, cardiac stents    ROS comment: ECG NSR     Neuro/Psych  (+) headaches,    (-) seizures, CVA  GI/Hepatic/Renal/Endo    (+)  hiatal hernia, GERD, PUD, GI bleeding , renal disease stones,   (-) hepatitis, diabetes, no thyroid disorder    Musculoskeletal     Abdominal    Substance History      OB/GYN          Other   arthritis, blood dyscrasia (Chronic amnemia pud BLEED IN PAST ) anemia,     ROS/Med Hx Other: HCT 26                 Anesthesia Plan    ASA 3     general     (Plan is for intraop Blood TF)  intravenous induction     Anesthetic plan, risks, benefits, and alternatives have been provided, discussed and informed consent has been obtained with: patient.    Plan discussed with CRNA.        CODE STATUS:

## 2022-06-28 NOTE — ANESTHESIA POSTPROCEDURE EVALUATION
Patient: Zulema Jarquin    Procedure Summary     Date: 06/28/22 Room / Location:  [facility]    Anesthesia Start: 1008 Anesthesia Stop: 1212    Procedure: PARAESOPHAGEAL HERNIA REPAIR LAPAROSCOPIC, WITH MESH, NISSEN , PEG/EGD (N/A Abdomen) Diagnosis:     Surgeons: Kamari Tristan MD Provider: Nick Meléndez MD    Anesthesia Type: general ASA Status: 3          Anesthesia Type: general    Vitals  Vitals Value Taken Time   /78 06/28/22 1230   Temp 99.2 °F (37.3 °C) 06/28/22 1208   Pulse 81 06/28/22 1233   Resp 16 06/28/22 1208   SpO2 100 % 06/28/22 1233   Vitals shown include unvalidated device data.        Post Anesthesia Care and Evaluation    Patient location during evaluation: PACU  Patient participation: complete - patient participated  Level of consciousness: awake and alert  Pain management: adequate    Airway patency: patent  Anesthetic complications: No anesthetic complications  PONV Status: none  Cardiovascular status: hemodynamically stable and acceptable  Respiratory status: nonlabored ventilation, acceptable and nasal cannula  Hydration status: acceptable

## 2022-06-28 NOTE — ANESTHESIA PROCEDURE NOTES
Airway  Urgency: elective    Date/Time: 6/28/2022 10:17 AM  Airway not difficult    General Information and Staff    Patient location during procedure: OR  CRNA/CAA: Denise Michele CRNA    Indications and Patient Condition  Indications for airway management: airway protection    Preoxygenated: yes  MILS not maintained throughout  Mask difficulty assessment: 1 - vent by mask    Final Airway Details  Final airway type: endotracheal airway      Successful airway: ETT  Cuffed: yes   Successful intubation technique: video laryngoscopy  Endotracheal tube insertion site: oral  Blade: Lopez  Blade size: 3  ETT size (mm): 7.0  Cormack-Lehane Classification: grade I - full view of glottis  Placement verified by: chest auscultation and capnometry   Measured from: lips  ETT/EBT  to lips (cm): 20  Number of attempts at approach: 1  Assessment: lips, teeth, and gum same as pre-op and atraumatic intubation    Additional Comments  Negative epigastric sounds, Breath sound equal bilaterally with symmetric chest rise and fall

## 2022-06-29 ENCOUNTER — APPOINTMENT (OUTPATIENT)
Dept: GENERAL RADIOLOGY | Facility: HOSPITAL | Age: 67
End: 2022-06-29

## 2022-06-29 LAB
ANION GAP SERPL CALCULATED.3IONS-SCNC: 9 MMOL/L (ref 5–15)
ANISOCYTOSIS BLD QL: NORMAL
BASOPHILS # BLD AUTO: 0.03 10*3/MM3 (ref 0–0.2)
BASOPHILS NFR BLD AUTO: 0.3 % (ref 0–1.5)
BH BB BLOOD EXPIRATION DATE: NORMAL
BH BB BLOOD EXPIRATION DATE: NORMAL
BH BB BLOOD TYPE BARCODE: 5100
BH BB BLOOD TYPE BARCODE: 5100
BH BB DISPENSE STATUS: NORMAL
BH BB DISPENSE STATUS: NORMAL
BH BB PRODUCT CODE: NORMAL
BH BB PRODUCT CODE: NORMAL
BH BB UNIT NUMBER: NORMAL
BH BB UNIT NUMBER: NORMAL
BUN SERPL-MCNC: 10 MG/DL (ref 8–23)
BUN/CREAT SERPL: 14.3 (ref 7–25)
CALCIUM SPEC-SCNC: 9.1 MG/DL (ref 8.6–10.5)
CHLORIDE SERPL-SCNC: 106 MMOL/L (ref 98–107)
CO2 SERPL-SCNC: 25 MMOL/L (ref 22–29)
CREAT SERPL-MCNC: 0.7 MG/DL (ref 0.57–1)
CROSSMATCH INTERPRETATION: NORMAL
CROSSMATCH INTERPRETATION: NORMAL
CYTO UR: NORMAL
DEPRECATED RDW RBC AUTO: 58.7 FL (ref 37–54)
EGFRCR SERPLBLD CKD-EPI 2021: 94.3 ML/MIN/1.73
EOSINOPHIL # BLD AUTO: 0 10*3/MM3 (ref 0–0.4)
EOSINOPHIL NFR BLD AUTO: 0 % (ref 0.3–6.2)
ERYTHROCYTE [DISTWIDTH] IN BLOOD BY AUTOMATED COUNT: 22.4 % (ref 12.3–15.4)
EST. AVERAGE GLUCOSE BLD GHB EST-MCNC: 100 MG/DL
GLUCOSE SERPL-MCNC: 115 MG/DL (ref 65–99)
HBA1C MFR BLD: 5.1 % (ref 4.8–5.6)
HCT VFR BLD AUTO: 29.1 % (ref 34–46.6)
HCT VFR BLD AUTO: 35.4 % (ref 34–46.6)
HGB BLD-MCNC: 10.7 G/DL (ref 12–15.9)
HGB BLD-MCNC: 7.9 G/DL (ref 12–15.9)
HYPOCHROMIA BLD QL: NORMAL
IMM GRANULOCYTES # BLD AUTO: 0.06 10*3/MM3 (ref 0–0.05)
IMM GRANULOCYTES NFR BLD AUTO: 0.5 % (ref 0–0.5)
LAB AP CASE REPORT: NORMAL
LAB AP CLINICAL INFORMATION: NORMAL
LYMPHOCYTES # BLD AUTO: 1.3 10*3/MM3 (ref 0.7–3.1)
LYMPHOCYTES NFR BLD AUTO: 11.1 % (ref 19.6–45.3)
MCH RBC QN AUTO: 20.2 PG (ref 26.6–33)
MCHC RBC AUTO-ENTMCNC: 27.1 G/DL (ref 31.5–35.7)
MCV RBC AUTO: 74.2 FL (ref 79–97)
MICROCYTES BLD QL: NORMAL
MONOCYTES # BLD AUTO: 1.03 10*3/MM3 (ref 0.1–0.9)
MONOCYTES NFR BLD AUTO: 8.8 % (ref 5–12)
NEUTROPHILS NFR BLD AUTO: 79.3 % (ref 42.7–76)
NEUTROPHILS NFR BLD AUTO: 9.3 10*3/MM3 (ref 1.7–7)
NRBC BLD AUTO-RTO: 0 /100 WBC (ref 0–0.2)
PATH REPORT.FINAL DX SPEC: NORMAL
PATH REPORT.GROSS SPEC: NORMAL
PLAT MORPH BLD: NORMAL
PLATELET # BLD AUTO: 275 10*3/MM3 (ref 140–450)
PMV BLD AUTO: 9.4 FL (ref 6–12)
POTASSIUM SERPL-SCNC: 3.8 MMOL/L (ref 3.5–5.2)
RBC # BLD AUTO: 3.92 10*6/MM3 (ref 3.77–5.28)
SODIUM SERPL-SCNC: 140 MMOL/L (ref 136–145)
UNIT  ABO: NORMAL
UNIT  ABO: NORMAL
UNIT  RH: NORMAL
UNIT  RH: NORMAL
WBC MORPH BLD: NORMAL
WBC NRBC COR # BLD: 11.72 10*3/MM3 (ref 3.4–10.8)

## 2022-06-29 PROCEDURE — 85025 COMPLETE CBC W/AUTO DIFF WBC: CPT | Performed by: SURGERY

## 2022-06-29 PROCEDURE — 85007 BL SMEAR W/DIFF WBC COUNT: CPT | Performed by: SURGERY

## 2022-06-29 PROCEDURE — 86900 BLOOD TYPING SEROLOGIC ABO: CPT

## 2022-06-29 PROCEDURE — 36430 TRANSFUSION BLD/BLD COMPNT: CPT

## 2022-06-29 PROCEDURE — 85014 HEMATOCRIT: CPT | Performed by: SURGERY

## 2022-06-29 PROCEDURE — 0 DIATRIZOATE MEGLUMINE & SODIUM PER 1 ML: Performed by: SURGERY

## 2022-06-29 PROCEDURE — 25010000002 HEPARIN (PORCINE) PER 1000 UNITS: Performed by: SURGERY

## 2022-06-29 PROCEDURE — P9016 RBC LEUKOCYTES REDUCED: HCPCS

## 2022-06-29 PROCEDURE — 74240 X-RAY XM UPR GI TRC 1CNTRST: CPT

## 2022-06-29 PROCEDURE — 80048 BASIC METABOLIC PNL TOTAL CA: CPT | Performed by: SURGERY

## 2022-06-29 PROCEDURE — 85018 HEMOGLOBIN: CPT | Performed by: SURGERY

## 2022-06-29 RX ADMIN — HYDROCODONE BITARTRATE AND ACETAMINOPHEN 15 ML: 7.5; 325 SOLUTION ORAL at 19:26

## 2022-06-29 RX ADMIN — HEPARIN SODIUM 5000 UNITS: 5000 INJECTION INTRAVENOUS; SUBCUTANEOUS at 14:26

## 2022-06-29 RX ADMIN — HEPARIN SODIUM 5000 UNITS: 5000 INJECTION INTRAVENOUS; SUBCUTANEOUS at 21:12

## 2022-06-29 RX ADMIN — HYDROCODONE BITARTRATE AND ACETAMINOPHEN 15 ML: 7.5; 325 SOLUTION ORAL at 14:26

## 2022-06-29 RX ADMIN — HYDROCODONE BITARTRATE AND ACETAMINOPHEN 15 ML: 7.5; 325 SOLUTION ORAL at 10:14

## 2022-06-29 RX ADMIN — SIMETHICONE 80 MG: 80 TABLET, CHEWABLE ORAL at 15:16

## 2022-06-29 RX ADMIN — SODIUM CHLORIDE, POTASSIUM CHLORIDE, SODIUM LACTATE AND CALCIUM CHLORIDE 125 ML/HR: 600; 310; 30; 20 INJECTION, SOLUTION INTRAVENOUS at 06:11

## 2022-06-29 RX ADMIN — PANTOPRAZOLE SODIUM 40 MG: 40 TABLET, DELAYED RELEASE ORAL at 06:00

## 2022-06-29 RX ADMIN — DOCUSATE SODIUM 100 MG: 50 LIQUID ORAL at 09:00

## 2022-06-29 RX ADMIN — HEPARIN SODIUM 5000 UNITS: 5000 INJECTION INTRAVENOUS; SUBCUTANEOUS at 06:00

## 2022-06-29 RX ADMIN — DIATRIZOATE MEGLUMINE AND DIATRIZOATE SODIUM 120 ML: 660; 100 LIQUID ORAL; RECTAL at 12:16

## 2022-06-29 RX ADMIN — HYDROCODONE BITARTRATE AND ACETAMINOPHEN 15 ML: 7.5; 325 SOLUTION ORAL at 04:24

## 2022-06-29 RX ADMIN — Medication 5000 UNITS: at 09:00

## 2022-06-30 ENCOUNTER — APPOINTMENT (OUTPATIENT)
Dept: CARDIOLOGY | Facility: HOSPITAL | Age: 67
End: 2022-06-30

## 2022-06-30 LAB
ANION GAP SERPL CALCULATED.3IONS-SCNC: 9 MMOL/L (ref 5–15)
BH BB BLOOD EXPIRATION DATE: NORMAL
BH BB BLOOD EXPIRATION DATE: NORMAL
BH BB BLOOD TYPE BARCODE: 5100
BH BB BLOOD TYPE BARCODE: 5100
BH BB DISPENSE STATUS: NORMAL
BH BB DISPENSE STATUS: NORMAL
BH BB PRODUCT CODE: NORMAL
BH BB PRODUCT CODE: NORMAL
BH BB UNIT NUMBER: NORMAL
BH BB UNIT NUMBER: NORMAL
BH CV ECHO MEAS - AO MAX PG: 11.3 MMHG
BH CV ECHO MEAS - AO MEAN PG: 6 MMHG
BH CV ECHO MEAS - AO ROOT DIAM: 2.5 CM
BH CV ECHO MEAS - AO V2 MAX: 168 CM/SEC
BH CV ECHO MEAS - AO V2 VTI: 30.5 CM
BH CV ECHO MEAS - AVA(I,D): 2.7 CM2
BH CV ECHO MEAS - EDV(CUBED): 74.1 ML
BH CV ECHO MEAS - EDV(MOD-SP2): 92.2 ML
BH CV ECHO MEAS - EDV(MOD-SP4): 75.7 ML
BH CV ECHO MEAS - EF(MOD-BP): 59 %
BH CV ECHO MEAS - EF(MOD-SP2): 59.3 %
BH CV ECHO MEAS - EF(MOD-SP4): 59 %
BH CV ECHO MEAS - ESV(CUBED): 17.6 ML
BH CV ECHO MEAS - ESV(MOD-SP2): 37.5 ML
BH CV ECHO MEAS - ESV(MOD-SP4): 31 ML
BH CV ECHO MEAS - FS: 38.1 %
BH CV ECHO MEAS - IVS/LVPW: 1 CM
BH CV ECHO MEAS - IVSD: 1 CM
BH CV ECHO MEAS - LA DIMENSION: 4.6 CM
BH CV ECHO MEAS - LAT PEAK E' VEL: 11 CM/SEC
BH CV ECHO MEAS - LV MASS(C)D: 137.2 GRAMS
BH CV ECHO MEAS - LV MAX PG: 7.7 MMHG
BH CV ECHO MEAS - LV MEAN PG: 4 MMHG
BH CV ECHO MEAS - LV V1 MAX: 139 CM/SEC
BH CV ECHO MEAS - LV V1 VTI: 26.1 CM
BH CV ECHO MEAS - LVIDD: 4.2 CM
BH CV ECHO MEAS - LVIDS: 2.6 CM
BH CV ECHO MEAS - LVOT AREA: 3.1 CM2
BH CV ECHO MEAS - LVOT DIAM: 2 CM
BH CV ECHO MEAS - LVPWD: 1 CM
BH CV ECHO MEAS - MED PEAK E' VEL: 9.1 CM/SEC
BH CV ECHO MEAS - MV A MAX VEL: 134.5 CM/SEC
BH CV ECHO MEAS - MV DEC SLOPE: 560 CM/SEC2
BH CV ECHO MEAS - MV DEC TIME: 0.26 MSEC
BH CV ECHO MEAS - MV E MAX VEL: 116.5 CM/SEC
BH CV ECHO MEAS - MV E/A: 0.87
BH CV ECHO MEAS - MV MAX PG: 8.2 MMHG
BH CV ECHO MEAS - MV MEAN PG: 4 MMHG
BH CV ECHO MEAS - MV P1/2T: 74.8 MSEC
BH CV ECHO MEAS - MV V2 VTI: 35.5 CM
BH CV ECHO MEAS - MVA(P1/2T): 2.9 CM2
BH CV ECHO MEAS - MVA(VTI): 2.31 CM2
BH CV ECHO MEAS - PA ACC TIME: 0.17 SEC
BH CV ECHO MEAS - PA PR(ACCEL): 1.15 MMHG
BH CV ECHO MEAS - PA V2 MAX: 111 CM/SEC
BH CV ECHO MEAS - RAP SYSTOLE: 3 MMHG
BH CV ECHO MEAS - RVSP: 31 MMHG
BH CV ECHO MEAS - SV(LVOT): 82 ML
BH CV ECHO MEAS - SV(MOD-SP2): 54.7 ML
BH CV ECHO MEAS - SV(MOD-SP4): 44.7 ML
BH CV ECHO MEAS - TAPSE (>1.6): 1.78 CM
BH CV ECHO MEAS - TR MAX PG: 28 MMHG
BH CV ECHO MEAS - TR MAX VEL: 265 CM/SEC
BH CV ECHO MEASUREMENTS AVERAGE E/E' RATIO: 11.59
BH CV VAS BP LEFT ARM: NORMAL MMHG
BH CV XLRA - RV BASE: 3.2 CM
BH CV XLRA - RV LENGTH: 5.1 CM
BH CV XLRA - RV MID: 2.1 CM
BH CV XLRA - TDI S': 15 CM/SEC
BUN SERPL-MCNC: 8 MG/DL (ref 8–23)
BUN/CREAT SERPL: 14 (ref 7–25)
CALCIUM SPEC-SCNC: 8.8 MG/DL (ref 8.6–10.5)
CHLORIDE SERPL-SCNC: 105 MMOL/L (ref 98–107)
CO2 SERPL-SCNC: 26 MMOL/L (ref 22–29)
CREAT SERPL-MCNC: 0.57 MG/DL (ref 0.57–1)
CROSSMATCH INTERPRETATION: NORMAL
CROSSMATCH INTERPRETATION: NORMAL
DEPRECATED RDW RBC AUTO: 61.1 FL (ref 37–54)
EGFRCR SERPLBLD CKD-EPI 2021: 99.1 ML/MIN/1.73
ERYTHROCYTE [DISTWIDTH] IN BLOOD BY AUTOMATED COUNT: 23.3 % (ref 12.3–15.4)
GLUCOSE SERPL-MCNC: 105 MG/DL (ref 65–99)
HCT VFR BLD AUTO: 37.5 % (ref 34–46.6)
HGB BLD-MCNC: 11.2 G/DL (ref 12–15.9)
IRON 24H UR-MRATE: 21 MCG/DL (ref 37–145)
IRON SATN MFR SERPL: 5 % (ref 20–50)
LEFT ATRIUM VOLUME INDEX: 35.1 ML/M2
MAXIMAL PREDICTED HEART RATE: 152 BPM
MCH RBC QN AUTO: 22.3 PG (ref 26.6–33)
MCHC RBC AUTO-ENTMCNC: 29.9 G/DL (ref 31.5–35.7)
MCV RBC AUTO: 74.7 FL (ref 79–97)
PLATELET # BLD AUTO: 266 10*3/MM3 (ref 140–450)
PMV BLD AUTO: 9.7 FL (ref 6–12)
POTASSIUM SERPL-SCNC: 3.7 MMOL/L (ref 3.5–5.2)
QT INTERVAL: 288 MS
QT INTERVAL: 378 MS
QTC INTERVAL: 418 MS
QTC INTERVAL: 459 MS
RBC # BLD AUTO: 5.02 10*6/MM3 (ref 3.77–5.28)
SODIUM SERPL-SCNC: 140 MMOL/L (ref 136–145)
STRESS TARGET HR: 129 BPM
TIBC SERPL-MCNC: 405 MCG/DL (ref 298–536)
TRANSFERRIN SERPL-MCNC: 272 MG/DL (ref 200–360)
UNIT  ABO: NORMAL
UNIT  ABO: NORMAL
UNIT  RH: NORMAL
UNIT  RH: NORMAL
WBC NRBC COR # BLD: 11.06 10*3/MM3 (ref 3.4–10.8)

## 2022-06-30 PROCEDURE — 85027 COMPLETE CBC AUTOMATED: CPT | Performed by: SURGERY

## 2022-06-30 PROCEDURE — 93306 TTE W/DOPPLER COMPLETE: CPT

## 2022-06-30 PROCEDURE — 97161 PT EVAL LOW COMPLEX 20 MIN: CPT

## 2022-06-30 PROCEDURE — 93306 TTE W/DOPPLER COMPLETE: CPT | Performed by: INTERNAL MEDICINE

## 2022-06-30 PROCEDURE — 25010000002 HEPARIN (PORCINE) PER 1000 UNITS: Performed by: SURGERY

## 2022-06-30 PROCEDURE — 99204 OFFICE O/P NEW MOD 45 MIN: CPT | Performed by: INTERNAL MEDICINE

## 2022-06-30 PROCEDURE — 80048 BASIC METABOLIC PNL TOTAL CA: CPT | Performed by: SURGERY

## 2022-06-30 PROCEDURE — 97530 THERAPEUTIC ACTIVITIES: CPT

## 2022-06-30 PROCEDURE — 93010 ELECTROCARDIOGRAM REPORT: CPT | Performed by: INTERNAL MEDICINE

## 2022-06-30 PROCEDURE — 84466 ASSAY OF TRANSFERRIN: CPT | Performed by: INTERNAL MEDICINE

## 2022-06-30 PROCEDURE — 93005 ELECTROCARDIOGRAM TRACING: CPT | Performed by: SURGERY

## 2022-06-30 PROCEDURE — 25010000002 AMIODARONE IN DEXTROSE 5% 360-4.14 MG/200ML-% SOLUTION: Performed by: SURGERY

## 2022-06-30 PROCEDURE — 83540 ASSAY OF IRON: CPT | Performed by: INTERNAL MEDICINE

## 2022-06-30 RX ORDER — AMIODARONE HYDROCHLORIDE 200 MG/1
400 TABLET ORAL EVERY 12 HOURS SCHEDULED
Status: DISCONTINUED | OUTPATIENT
Start: 2022-06-30 | End: 2022-07-01 | Stop reason: HOSPADM

## 2022-06-30 RX ADMIN — HEPARIN SODIUM 5000 UNITS: 5000 INJECTION INTRAVENOUS; SUBCUTANEOUS at 21:05

## 2022-06-30 RX ADMIN — HYDROCODONE BITARTRATE AND ACETAMINOPHEN 15 ML: 7.5; 325 SOLUTION ORAL at 13:34

## 2022-06-30 RX ADMIN — AMIODARONE HYDROCHLORIDE 0.5 MG/MIN: 1.8 INJECTION, SOLUTION INTRAVENOUS at 09:19

## 2022-06-30 RX ADMIN — HYDROCODONE BITARTRATE AND ACETAMINOPHEN 15 ML: 7.5; 325 SOLUTION ORAL at 01:11

## 2022-06-30 RX ADMIN — Medication 5000 UNITS: at 08:19

## 2022-06-30 RX ADMIN — AMIODARONE HYDROCHLORIDE 400 MG: 200 TABLET ORAL at 21:05

## 2022-06-30 RX ADMIN — HYDROCODONE BITARTRATE AND ACETAMINOPHEN 15 ML: 7.5; 325 SOLUTION ORAL at 19:21

## 2022-06-30 RX ADMIN — METOPROLOL TARTRATE 12.5 MG: 25 TABLET, FILM COATED ORAL at 21:05

## 2022-06-30 RX ADMIN — HEPARIN SODIUM 5000 UNITS: 5000 INJECTION INTRAVENOUS; SUBCUTANEOUS at 06:21

## 2022-06-30 RX ADMIN — HYDROCODONE BITARTRATE AND ACETAMINOPHEN 15 ML: 7.5; 325 SOLUTION ORAL at 08:37

## 2022-06-30 RX ADMIN — DOCUSATE SODIUM 100 MG: 50 LIQUID ORAL at 08:19

## 2022-06-30 RX ADMIN — SIMETHICONE 80 MG: 80 TABLET, CHEWABLE ORAL at 08:37

## 2022-06-30 RX ADMIN — HEPARIN SODIUM 5000 UNITS: 5000 INJECTION INTRAVENOUS; SUBCUTANEOUS at 13:34

## 2022-06-30 RX ADMIN — METOPROLOL TARTRATE 12.5 MG: 25 TABLET, FILM COATED ORAL at 13:34

## 2022-06-30 RX ADMIN — PANTOPRAZOLE SODIUM 40 MG: 40 TABLET, DELAYED RELEASE ORAL at 06:20

## 2022-06-30 RX ADMIN — AMIODARONE HYDROCHLORIDE 400 MG: 200 TABLET ORAL at 13:34

## 2022-07-01 VITALS
HEART RATE: 67 BPM | BODY MASS INDEX: 30.3 KG/M2 | TEMPERATURE: 98.4 F | WEIGHT: 160.5 LBS | RESPIRATION RATE: 18 BRPM | DIASTOLIC BLOOD PRESSURE: 84 MMHG | OXYGEN SATURATION: 93 % | HEIGHT: 61 IN | SYSTOLIC BLOOD PRESSURE: 169 MMHG

## 2022-07-01 PROBLEM — I48.92 ATRIAL FLUTTER (HCC): Status: ACTIVE | Noted: 2022-07-01

## 2022-07-01 LAB
ANION GAP SERPL CALCULATED.3IONS-SCNC: 10 MMOL/L (ref 5–15)
BUN SERPL-MCNC: 8 MG/DL (ref 8–23)
BUN/CREAT SERPL: 13.3 (ref 7–25)
CALCIUM SPEC-SCNC: 8.9 MG/DL (ref 8.6–10.5)
CHLORIDE SERPL-SCNC: 103 MMOL/L (ref 98–107)
CO2 SERPL-SCNC: 24 MMOL/L (ref 22–29)
CREAT SERPL-MCNC: 0.6 MG/DL (ref 0.57–1)
DEPRECATED RDW RBC AUTO: 64.3 FL (ref 37–54)
EGFRCR SERPLBLD CKD-EPI 2021: 97.9 ML/MIN/1.73
ERYTHROCYTE [DISTWIDTH] IN BLOOD BY AUTOMATED COUNT: 24 % (ref 12.3–15.4)
GLUCOSE SERPL-MCNC: 94 MG/DL (ref 65–99)
HCT VFR BLD AUTO: 38.2 % (ref 34–46.6)
HGB BLD-MCNC: 11.2 G/DL (ref 12–15.9)
MCH RBC QN AUTO: 22.3 PG (ref 26.6–33)
MCHC RBC AUTO-ENTMCNC: 29.3 G/DL (ref 31.5–35.7)
MCV RBC AUTO: 76.1 FL (ref 79–97)
PLATELET # BLD AUTO: 282 10*3/MM3 (ref 140–450)
PMV BLD AUTO: 10.1 FL (ref 6–12)
POTASSIUM SERPL-SCNC: 3.6 MMOL/L (ref 3.5–5.2)
QT INTERVAL: 452 MS
QTC INTERVAL: 504 MS
RBC # BLD AUTO: 5.02 10*6/MM3 (ref 3.77–5.28)
SODIUM SERPL-SCNC: 137 MMOL/L (ref 136–145)
WBC NRBC COR # BLD: 9.76 10*3/MM3 (ref 3.4–10.8)

## 2022-07-01 PROCEDURE — 80048 BASIC METABOLIC PNL TOTAL CA: CPT | Performed by: SURGERY

## 2022-07-01 PROCEDURE — 93005 ELECTROCARDIOGRAM TRACING: CPT | Performed by: INTERNAL MEDICINE

## 2022-07-01 PROCEDURE — 85027 COMPLETE CBC AUTOMATED: CPT | Performed by: SURGERY

## 2022-07-01 PROCEDURE — 93010 ELECTROCARDIOGRAM REPORT: CPT | Performed by: INTERNAL MEDICINE

## 2022-07-01 PROCEDURE — 99214 OFFICE O/P EST MOD 30 MIN: CPT | Performed by: INTERNAL MEDICINE

## 2022-07-01 PROCEDURE — 25010000002 HEPARIN (PORCINE) PER 1000 UNITS: Performed by: SURGERY

## 2022-07-01 RX ORDER — ONDANSETRON 4 MG/1
4 TABLET, FILM COATED ORAL EVERY 6 HOURS PRN
Qty: 30 TABLET | Refills: 1 | Status: SHIPPED | OUTPATIENT
Start: 2022-07-01 | End: 2022-07-11 | Stop reason: HOSPADM

## 2022-07-01 RX ORDER — METOPROLOL SUCCINATE 25 MG/1
25 TABLET, EXTENDED RELEASE ORAL DAILY
Qty: 30 TABLET | Refills: 11 | Status: SHIPPED | OUTPATIENT
Start: 2022-07-01

## 2022-07-01 RX ORDER — AMIODARONE HYDROCHLORIDE 200 MG/1
200 TABLET ORAL DAILY
Qty: 14 TABLET | Refills: 0 | Status: SHIPPED | OUTPATIENT
Start: 2022-07-01

## 2022-07-01 RX ADMIN — DOCUSATE SODIUM 100 MG: 50 LIQUID ORAL at 08:08

## 2022-07-01 RX ADMIN — HYDROCODONE BITARTRATE AND ACETAMINOPHEN 15 ML: 7.5; 325 SOLUTION ORAL at 01:22

## 2022-07-01 RX ADMIN — HEPARIN SODIUM 5000 UNITS: 5000 INJECTION INTRAVENOUS; SUBCUTANEOUS at 05:20

## 2022-07-01 RX ADMIN — Medication 5000 UNITS: at 08:08

## 2022-07-01 RX ADMIN — PANTOPRAZOLE SODIUM 40 MG: 40 TABLET, DELAYED RELEASE ORAL at 06:07

## 2022-07-01 RX ADMIN — HYDROCODONE BITARTRATE AND ACETAMINOPHEN 15 ML: 7.5; 325 SOLUTION ORAL at 08:13

## 2022-07-01 RX ADMIN — AMIODARONE HYDROCHLORIDE 400 MG: 200 TABLET ORAL at 08:08

## 2022-07-01 RX ADMIN — METOPROLOL TARTRATE 12.5 MG: 25 TABLET, FILM COATED ORAL at 08:08

## 2022-07-01 NOTE — CASE MANAGEMENT/SOCIAL WORK
Continued Stay Note  [facility]     Patient Name: Zulema Jarquin  MRN: [id number]  Today's Date: 7/1/2022    Admit Date: 6/28/2022     Discharge Plan     Row Name 07/01/22 1132       Plan    Plan Home    Patient/Family in Agreement with Plan yes    Final Discharge Disposition Code 01 - home or self-care    Final Note Mrs. Jarquin being discharge home today.  She is currently on room air.  Therapy has recommended home with assist.  No needs identified at this time.  Spouse to transport home.               Discharge Codes    No documentation.               Expected Discharge Date and Time     Expected Discharge Date Expected Discharge Time    Jul 1, 2022             Marilyn Rosenthal RN

## 2022-07-01 NOTE — DISCHARGE SUMMARY
Discharge Summary    Patient Name:  Zulema Jarquin  YOB: 1955  [id number]      DATE OF ADMISSION: 6/28/2022    DATE OF DISCHARGE: 7/1/2022       FINAL DIAGNOSES:   Patient Active Problem List   Diagnosis   • GIB (gastrointestinal bleeding)   • Symptomatic anemia   • Hyperglycemia   • Osteoporosis   • Acute blood loss anemia   • Mikey lesion, chronic   • Hernia, hiatal   • Iron deficiency anemia   • Iron malabsorption   • B12 deficiency   • Paraesophageal hernia   • Atrial flutter (HCC)       CONSULTING SERVICES: Dr. Holland with cardiology     PROCEDURES PERFORMED:   1. Laparoscopic Paraesophageal hernia repair with mesh, Nissen fundoplication, EGD with PEG Placement  on 6/28/2022    HISTORY: The patient is a very pleasant 66 y.o. female with a history of GERD and paraesophageal hernia  . After a complete pre-operative workup she was deemed an operative candidate. The risks and benefits of operation were discussed at length with the patient and their family, and they agree to proceed.      HOSPITAL COURSE:  The patient came in as an outpatient, underwent her operative procedure, and was transferred to the floor.  Postoperatively she did well.  Their pain was initially controlled on IV PCA, and transitioned to oral medications.  A Gastrografin swallow on postoperative day 1 showed an excellent technical result .  However, she developed atrial flutter and cardiology was consulted.  She converted back to a normal sinus rhythm on a minimal dose of amiodarone.  She was transfused a total of 4 units of blood given her chronic anemia and her preoperative hemoglobin of 6.  They continued to improve, were instructed on appropriate dietary changes, instructed in the care of her PEG , and ready for discharge home on 7/1/2022 .      CONDITION: At the time of discharge, the patient is tolerating a post-fundoplication  diet without difficulty. They are having normal bowel and bladder function, and her incisions  are clean, dry and intact. her PEG tube is clean, dry and intact and she has been taught in its care by the nursing staff.  He is in a normal sinus rhythm, and arrangements are being made for outpatient Holter monitoring for her atrial flutter.     DISCHARGE MEDICATIONS:   1. All previous home medications.   2. Hydrocodone  elixir 7.5mg PO Q4 hours as needed for pain  3. Colace elixir 100mg PO BID   4.  Zofran 4 mg PO Q6 hours as needed for nausea       DISCHARGE INSTRUCTIONS:  1. The patient is instructed to follow up with Dr. Tristan in  4  weeks’ time.  2. she is instructed to refrain from lifting more than 15 or 20 pounds until their return to clinic.   3. If the patient has worsening problems with fever or chills nausea or vomiting she is to contact Dr. Tristan's office and a contact card has been provided.  4. she is to keep her PEG tube clamped, and flush it twice daily with 60ml of water. If there is nausea or bloating uncontrolled with medications, she is to place the tube to gravity for 30 minutes, then reclamp the tube. They have been taught this by the nursing staff prior to discharge.  5.  She is to follow-up with her primary care physician in 2 weeks time  6.  She has outpatient Holter monitoring established.      Kamari Tristan MD  7/1/2022

## 2022-07-01 NOTE — PROGRESS NOTES
"[facility] Progress Note     LOS: 0 days   Patient Care Team:  Micha Hernandez MD as PCP - Mina Horton MD as Consulting Physician (Gastroenterology)  PCP:  Micha Hernandez MD    Chief Complaint: Paroxysmal atrial fibrillation    SUBJECTIVE: Sinus rhythm overnight.  Stable for discharge from postsurgical standpoint.      Review of Systems:   All systems have been reviewed and are negative with the exception of those mentioned above.      OBJECTIVE:    Vital Sign Min/Max for last 24 hours  Temp  Min: 98.1 °F (36.7 °C)  Max: 99.6 °F (37.6 °C)   BP  Min: 130/71  Max: 177/105   Pulse  Min: 66  Max: 86   Resp  Min: 18  Max: 18   SpO2  Min: 90 %  Max: 94 %   No data recorded   No data recorded     Flowsheet Rows    Flowsheet Row First Filed Value   Admission Height 154.9 cm (60.98\") Documented at 06/28/2022 0945   Admission Weight 72.8 kg (160 lb 7.9 oz) Documented at 06/28/2022 0945          Telemetry: Sinus rhythm      Intake/Output Summary (Last 24 hours) at 7/1/2022 1040  Last data filed at 6/30/2022 2000  Gross per 24 hour   Intake 60 ml   Output --   Net 60 ml     Intake & Output (last 3 days)       06/28 0701  06/29 0700 06/29 0701  06/30 0700 06/30 0701  07/01 0700 07/01 0701  07/02 0700    I.V. (mL/kg) 1001.4 (13.8)       Blood 886 633      Other 120 60 60     Total Intake(mL/kg) 2007.4 (27.6) 693 (9.5) 60 (0.8)     Urine (mL/kg/hr) 0       Emesis/NG output 450       Total Output 450       Net +1557.4 +693 +60             Urine Unmeasured Occurrence 2 x 2 x 1 x 1 x           Physical Exam:    General Appearance:    Alert, cooperative, no distress, appears stated age   Neck:   Supple, symmetrical, trachea midline.   Lungs:     Clear to auscultation bilaterally, respirations unlabored   Chest Wall:    No tenderness or deformity    Heart:    Regular rate and rhythm, S1 and S2 normal, no murmur, rub   or gallop, normal carotid impulse bilaterally " without bruit.   Extremities:   Extremities normal, atraumatic, no cyanosis or edema   Pulses:   2+ and symmetric all extremities   Skin:   Skin color, texture, turgor normal, no rashes or lesions      LABS/DIAGNOSTIC DATA:  Results from last 7 days   Lab Units 07/01/22  0445 06/30/22  0620 06/29/22  1640 06/29/22  0544   WBC 10*3/mm3 9.76 11.06*  --  11.72*   HEMOGLOBIN g/dL 11.2* 11.2* 10.7* 7.9*   HEMATOCRIT % 38.2 37.5 35.4 29.1*   PLATELETS 10*3/mm3 282 266  --  275     No results found for: TROPONINT      Results from last 7 days   Lab Units 07/01/22 0445 06/30/22  0620 06/29/22  1640   SODIUM mmol/L 137 140 140   POTASSIUM mmol/L 3.6 3.7 3.8   CHLORIDE mmol/L 103 105 106   CO2 mmol/L 24.0 26.0 25.0   BUN mg/dL 8 8 10   CREATININE mg/dL 0.60 0.57 0.70   CALCIUM mg/dL 8.9 8.8 9.1   GLUCOSE mg/dL 94 105* 115*     Results from last 7 days   Lab Units 06/28/22  1022   HEMOGLOBIN A1C % 5.1                   Medication Review:   amiodarone, 400 mg, Oral, Q12H  docusate sodium, 100 mg, Oral, Daily  heparin (porcine), 5,000 Units, Subcutaneous, Q8H  metoprolol tartrate, 12.5 mg, Oral, Q12H  pantoprazole, 40 mg, Oral, QAM  vitamin D3, 5,000 Units, Oral, Daily             ASSESSMENT/PLAN:    Hernia, hiatal    Symptomatic anemia    Paraesophageal hernia    Atrial flutter (HCC)        Paroxysmal atrial fibrillation occurring status post laparoscopic paraesophageal hernia repair with Nissen fundoplication: Maintaining sinus rhythm following IV amiodarone load.  2-week course of amiodarone 200 mg p.o. daily.  Adding low-dose Toprol.  We will arrange for outpatient 2 to 4-week ambulatory ECG monitor and cardiology follow-up.  Not recommending anticoagulation given history of iron deficiency anemia and isolated episode of perioperative A. fib.  She was asymptomatic with this episode and we discussed that it might be evaluated to have a way to monitor her heart rate going forward such as a smart watch without  capability.          Davon Rudolph III, MD   07/01/22  10:40 EDT

## 2022-07-01 NOTE — PLAN OF CARE
Problem: Adult Inpatient Plan of Care  Goal: Plan of Care Review  Outcome: Ongoing, Progressing  Flowsheets (Taken 7/1/2022 0627)  Progress: improving  Plan of Care Reviewed With: patient  Outcome Evaluation: Pt. stopped amiodarone drip this morning for a-flutter. Pt. was able to sleep for awhile throughout the night. Pt. has increased BP while in pain otherwise BP along with rest of vitals are under control. Pt. verbalized desire to go home today. Pt. able to ambulate to bathroom.  Goal: Patient-Specific Goal (Individualized)  Outcome: Ongoing, Progressing  Goal: Absence of Hospital-Acquired Illness or Injury  Outcome: Ongoing, Progressing  Intervention: Identify and Manage Fall Risk  Recent Flowsheet Documentation  Taken 7/1/2022 0600 by Gerardo Vasquez RN  Safety Promotion/Fall Prevention:   activity supervised   assistive device/personal items within reach   clutter free environment maintained   toileting scheduled   safety round/check completed   room organization consistent   nonskid shoes/slippers when out of bed   fall prevention program maintained   lighting adjusted  Taken 7/1/2022 0400 by Gerardo Vasquez RN  Safety Promotion/Fall Prevention:   activity supervised   assistive device/personal items within reach   clutter free environment maintained   toileting scheduled   safety round/check completed   room organization consistent   nonskid shoes/slippers when out of bed   fall prevention program maintained   lighting adjusted  Taken 7/1/2022 0200 by Gerardo Vasquez, RN  Safety Promotion/Fall Prevention:   toileting scheduled   safety round/check completed   room organization consistent   activity supervised   assistive device/personal items within reach   clutter free environment maintained   fall prevention program maintained   gait belt   lighting adjusted   nonskid shoes/slippers when out of bed  Taken 7/1/2022 0000 by Gerardo Vasquez, RN  Safety Promotion/Fall Prevention:   toileting scheduled   safety  round/check completed   room organization consistent   activity supervised   assistive device/personal items within reach   clutter free environment maintained   fall prevention program maintained   lighting adjusted   nonskid shoes/slippers when out of bed  Taken 6/30/2022 2200 by Gerardo Vasquez RN  Safety Promotion/Fall Prevention:   toileting scheduled   safety round/check completed   room organization consistent   activity supervised   assistive device/personal items within reach   clutter free environment maintained   fall prevention program maintained   lighting adjusted   nonskid shoes/slippers when out of bed  Taken 6/30/2022 2000 by Gerardo Vasquez RN  Safety Promotion/Fall Prevention:   safety round/check completed   room organization consistent   activity supervised   assistive device/personal items within reach   clutter free environment maintained   fall prevention program maintained   nonskid shoes/slippers when out of bed   lighting adjusted   toileting scheduled  Intervention: Prevent Skin Injury  Recent Flowsheet Documentation  Taken 7/1/2022 0600 by Gerardo Vasquez RN  Body Position: position changed independently  Taken 7/1/2022 0400 by Gerardo Vasquez RN  Body Position: position changed independently  Taken 7/1/2022 0200 by Gerardo Vasquez RN  Body Position: position changed independently  Taken 7/1/2022 0000 by Gerardo Vasquez RN  Body Position: position changed independently  Taken 6/30/2022 2200 by Gerardo Vasquez RN  Body Position: position changed independently  Taken 6/30/2022 2000 by Gerardo Vasquez RN  Body Position: position changed independently  Skin Protection:   adhesive use limited   transparent dressing maintained  Intervention: Prevent and Manage VTE (Venous Thromboembolism) Risk  Recent Flowsheet Documentation  Taken 7/1/2022 0600 by Gerardo Vasquez RN  Activity Management: activity adjusted per tolerance  Taken 7/1/2022 0400 by Gerardo Vasquez RN  Activity Management: activity  adjusted per tolerance  Taken 7/1/2022 0200 by Gerardo Vasquez RN  Activity Management: activity adjusted per tolerance  Taken 7/1/2022 0000 by Gerardo Vasquez RN  Activity Management: activity adjusted per tolerance  Taken 6/30/2022 2200 by Gerardo Vasquez RN  Activity Management: activity adjusted per tolerance  Taken 6/30/2022 2000 by Gerardo Vasquez RN  Activity Management: activity adjusted per tolerance  VTE Prevention/Management:   bilateral   dorsiflexion/plantar flexion performed  Range of Motion: active ROM (range of motion) encouraged  Intervention: Prevent Infection  Recent Flowsheet Documentation  Taken 7/1/2022 0600 by Gerardo Vasquez RN  Infection Prevention:   visitors restricted/screened   single patient room provided   rest/sleep promoted   personal protective equipment utilized   hand hygiene promoted   equipment surfaces disinfected  Taken 7/1/2022 0400 by Gerardo Vasquez RN  Infection Prevention:   visitors restricted/screened   single patient room provided   rest/sleep promoted   personal protective equipment utilized   hand hygiene promoted   equipment surfaces disinfected  Taken 7/1/2022 0200 by Gerardo Vasquez RN  Infection Prevention:   visitors restricted/screened   single patient room provided   rest/sleep promoted   personal protective equipment utilized   hand hygiene promoted   equipment surfaces disinfected  Taken 7/1/2022 0000 by Gerardo Vasquez RN  Infection Prevention:   visitors restricted/screened   single patient room provided   rest/sleep promoted   personal protective equipment utilized   hand hygiene promoted   equipment surfaces disinfected  Taken 6/30/2022 2200 by Gerardo Vasquez RN  Infection Prevention:   visitors restricted/screened   single patient room provided   rest/sleep promoted   personal protective equipment utilized   hand hygiene promoted   equipment surfaces disinfected  Taken 6/30/2022 2000 by Gerardo Vasquez RN  Infection Prevention:   visitors  restricted/screened   single patient room provided   rest/sleep promoted   personal protective equipment utilized   hand hygiene promoted   equipment surfaces disinfected  Goal: Optimal Comfort and Wellbeing  Outcome: Ongoing, Progressing  Intervention: Monitor Pain and Promote Comfort  Recent Flowsheet Documentation  Taken 7/1/2022 0600 by Gerardo Vasquez RN  Pain Management Interventions:   see MAR   quiet environment facilitated   position adjusted   pillow support provided  Taken 7/1/2022 0400 by Gerardo Vasquez RN  Pain Management Interventions:   see MAR   quiet environment facilitated   position adjusted   pillow support provided  Taken 7/1/2022 0200 by Gerardo Vasquez RN  Pain Management Interventions:   see MAR   quiet environment facilitated   position adjusted   pillow support provided  Taken 7/1/2022 0000 by Gerardo Vasquez RN  Pain Management Interventions:   see MAR   quiet environment facilitated   pillow support provided   pain pump in use   no interventions per patient request  Taken 6/30/2022 2200 by Gearrdo Vasquez RN  Pain Management Interventions:   see MAR   quiet environment facilitated   pillow support provided   position adjusted  Taken 6/30/2022 2000 by Gerardo Vasquez RN  Pain Management Interventions:   see MAR   quiet environment facilitated   position adjusted   pillow support provided  Intervention: Provide Person-Centered Care  Recent Flowsheet Documentation  Taken 6/30/2022 2000 by Gerardo Vasquez RN  Trust Relationship/Rapport:   care explained   questions answered  Goal: Readiness for Transition of Care  Outcome: Ongoing, Progressing     Problem: Pain Acute  Goal: Acceptable Pain Control and Functional Ability  Outcome: Ongoing, Progressing  Intervention: Prevent or Manage Pain  Recent Flowsheet Documentation  Taken 7/1/2022 0600 by Gerardo Vasquez RN  Medication Review/Management: medications reviewed  Taken 7/1/2022 0400 by Gerardo Vasquez RN  Medication Review/Management:  medications reviewed  Taken 7/1/2022 0200 by Gerardo Vasquez RN  Medication Review/Management: medications reviewed  Taken 7/1/2022 0000 by Gerardo Vasquez RN  Medication Review/Management: medications reviewed  Taken 6/30/2022 2200 by Gerardo Vasquez RN  Medication Review/Management: medications reviewed  Taken 6/30/2022 2000 by Gerardo Vasquez RN  Sensory Stimulation Regulation: television on  Intervention: Develop Pain Management Plan  Recent Flowsheet Documentation  Taken 7/1/2022 0600 by Gerardo Vasquez RN  Pain Management Interventions:   see MAR   quiet environment facilitated   position adjusted   pillow support provided  Taken 7/1/2022 0400 by Gerardo Vasquez RN  Pain Management Interventions:   see MAR   quiet environment facilitated   position adjusted   pillow support provided  Taken 7/1/2022 0200 by Gerardo Vasquez RN  Pain Management Interventions:   see MAR   quiet environment facilitated   position adjusted   pillow support provided  Taken 7/1/2022 0000 by Gerardo Vasquez RN  Pain Management Interventions:   see MAR   quiet environment facilitated   pillow support provided   pain pump in use   no interventions per patient request  Taken 6/30/2022 2200 by Gerardo Vasquez RN  Pain Management Interventions:   see MAR   quiet environment facilitated   pillow support provided   position adjusted  Taken 6/30/2022 2000 by Gerardo Vasquez RN  Pain Management Interventions:   see MAR   quiet environment facilitated   position adjusted   pillow support provided  Intervention: Optimize Psychosocial Wellbeing  Recent Flowsheet Documentation  Taken 6/30/2022 2000 by Gerardo Vasquez RN  Supportive Measures:   active listening utilized   self-care encouraged  Diversional Activities:   smartphone   television     Problem: Fall Injury Risk  Goal: Absence of Fall and Fall-Related Injury  Outcome: Ongoing, Progressing  Intervention: Identify and Manage Contributors  Recent Flowsheet Documentation  Taken 7/1/2022 0600  by Gerardo Vasquez RN  Medication Review/Management: medications reviewed  Taken 7/1/2022 0400 by Gerardo Vasquez RN  Medication Review/Management: medications reviewed  Taken 7/1/2022 0200 by Gerardo Vasquez RN  Medication Review/Management: medications reviewed  Taken 7/1/2022 0000 by Gerardo Vasquez RN  Medication Review/Management: medications reviewed  Taken 6/30/2022 2200 by Gerardo Vasquez RN  Medication Review/Management: medications reviewed  Intervention: Promote Injury-Free Environment  Recent Flowsheet Documentation  Taken 7/1/2022 0600 by Gerardo Vasquez RN  Safety Promotion/Fall Prevention:   activity supervised   assistive device/personal items within reach   clutter free environment maintained   toileting scheduled   safety round/check completed   room organization consistent   nonskid shoes/slippers when out of bed   fall prevention program maintained   lighting adjusted  Taken 7/1/2022 0400 by Gerardo Vasquez RN  Safety Promotion/Fall Prevention:   activity supervised   assistive device/personal items within reach   clutter free environment maintained   toileting scheduled   safety round/check completed   room organization consistent   nonskid shoes/slippers when out of bed   fall prevention program maintained   lighting adjusted  Taken 7/1/2022 0200 by Gerardo Vasquez RN  Safety Promotion/Fall Prevention:   toileting scheduled   safety round/check completed   room organization consistent   activity supervised   assistive device/personal items within reach   clutter free environment maintained   fall prevention program maintained   gait belt   lighting adjusted   nonskid shoes/slippers when out of bed  Taken 7/1/2022 0000 by Gerardo Vasquez RN  Safety Promotion/Fall Prevention:   toileting scheduled   safety round/check completed   room organization consistent   activity supervised   assistive device/personal items within reach   clutter free environment maintained   fall prevention program  maintained   lighting adjusted   nonskid shoes/slippers when out of bed  Taken 6/30/2022 2200 by Gerardo Vasquez, RN  Safety Promotion/Fall Prevention:   toileting scheduled   safety round/check completed   room organization consistent   activity supervised   assistive device/personal items within reach   clutter free environment maintained   fall prevention program maintained   lighting adjusted   nonskid shoes/slippers when out of bed  Taken 6/30/2022 2000 by Gerardo Vasquez RN  Safety Promotion/Fall Prevention:   safety round/check completed   room organization consistent   activity supervised   assistive device/personal items within reach   clutter free environment maintained   fall prevention program maintained   nonskid shoes/slippers when out of bed   lighting adjusted   toileting scheduled   Goal Outcome Evaluation:  Plan of Care Reviewed With: patient        Progress: improving  Outcome Evaluation: Pt. stopped amiodarone drip this morning for a-flutter. Pt. was able to sleep for awhile throughout the night. Pt. has increased BP while in pain otherwise BP along with rest of vitals are under control. Pt. verbalized desire to go home today. Pt. able to ambulate to bathroom.

## 2022-07-10 ENCOUNTER — HOSPITAL ENCOUNTER (OUTPATIENT)
Facility: HOSPITAL | Age: 67
LOS: 1 days | Discharge: HOME | End: 2022-07-11
Attending: EMERGENCY MEDICINE | Admitting: UROLOGY

## 2022-07-10 ENCOUNTER — ANESTHESIA EVENT (OUTPATIENT)
Dept: PERIOP | Facility: HOSPITAL | Age: 67
End: 2022-07-10

## 2022-07-10 ENCOUNTER — APPOINTMENT (OUTPATIENT)
Dept: CT IMAGING | Facility: HOSPITAL | Age: 67
End: 2022-07-10

## 2022-07-10 ENCOUNTER — APPOINTMENT (OUTPATIENT)
Dept: GENERAL RADIOLOGY | Facility: HOSPITAL | Age: 67
End: 2022-07-10

## 2022-07-10 ENCOUNTER — ANESTHESIA (OUTPATIENT)
Dept: PERIOP | Facility: HOSPITAL | Age: 67
End: 2022-07-10

## 2022-07-10 DIAGNOSIS — N20.0 KIDNEY STONE: ICD-10-CM

## 2022-07-10 DIAGNOSIS — N12 PYELONEPHRITIS: ICD-10-CM

## 2022-07-10 DIAGNOSIS — R10.9 ACUTE ABDOMINAL PAIN: ICD-10-CM

## 2022-07-10 DIAGNOSIS — D72.829 LEUKOCYTOSIS, UNSPECIFIED TYPE: ICD-10-CM

## 2022-07-10 DIAGNOSIS — D73.5 SPLENIC INFARCT: Primary | ICD-10-CM

## 2022-07-10 DIAGNOSIS — E87.20 LACTIC ACIDOSIS: ICD-10-CM

## 2022-07-10 DIAGNOSIS — N20.1 URETERAL STONE: ICD-10-CM

## 2022-07-10 PROBLEM — N17.9 AKI (ACUTE KIDNEY INJURY) (HCC): Status: ACTIVE | Noted: 2022-07-10

## 2022-07-10 PROBLEM — Z93.1 PRESENCE OF EXTERNALLY REMOVABLE PERCUTANEOUS ENDOSCOPIC GASTROSTOMY (PEG) TUBE (HCC): Status: ACTIVE | Noted: 2022-07-10

## 2022-07-10 PROBLEM — R11.2 N&V (NAUSEA AND VOMITING): Status: ACTIVE | Noted: 2022-07-10

## 2022-07-10 LAB
ALBUMIN SERPL-MCNC: 3.9 G/DL (ref 3.5–5.2)
ALBUMIN/GLOB SERPL: 1.1 G/DL
ALP SERPL-CCNC: 97 U/L (ref 39–117)
ALT SERPL W P-5'-P-CCNC: 11 U/L (ref 1–33)
ANION GAP SERPL CALCULATED.3IONS-SCNC: 17 MMOL/L (ref 5–15)
AST SERPL-CCNC: 15 U/L (ref 1–32)
BACTERIA UR QL AUTO: ABNORMAL /HPF
BASOPHILS # BLD AUTO: 0.05 10*3/MM3 (ref 0–0.2)
BASOPHILS NFR BLD AUTO: 0.3 % (ref 0–1.5)
BILIRUB SERPL-MCNC: 0.4 MG/DL (ref 0–1.2)
BILIRUB UR QL STRIP: NEGATIVE
BUN SERPL-MCNC: 13 MG/DL (ref 8–23)
BUN/CREAT SERPL: 10.2 (ref 7–25)
CALCIUM SPEC-SCNC: 10.2 MG/DL (ref 8.6–10.5)
CHLORIDE SERPL-SCNC: 103 MMOL/L (ref 98–107)
CLARITY UR: ABNORMAL
CO2 SERPL-SCNC: 19 MMOL/L (ref 22–29)
COLOR UR: YELLOW
CREAT SERPL-MCNC: 1.27 MG/DL (ref 0.57–1)
D-LACTATE SERPL-SCNC: 1.1 MMOL/L (ref 0.5–2)
D-LACTATE SERPL-SCNC: 2.6 MMOL/L (ref 0.5–2)
DEPRECATED RDW RBC AUTO: 69.6 FL (ref 37–54)
EGFRCR SERPLBLD CKD-EPI 2021: 46.2 ML/MIN/1.73
EOSINOPHIL # BLD AUTO: 0.01 10*3/MM3 (ref 0–0.4)
EOSINOPHIL NFR BLD AUTO: 0.1 % (ref 0.3–6.2)
ERYTHROCYTE [DISTWIDTH] IN BLOOD BY AUTOMATED COUNT: 26.6 % (ref 12.3–15.4)
GLOBULIN UR ELPH-MCNC: 3.7 GM/DL
GLUCOSE SERPL-MCNC: 193 MG/DL (ref 65–99)
GLUCOSE UR STRIP-MCNC: NEGATIVE MG/DL
GRAN CASTS URNS QL MICRO: ABNORMAL /LPF
HCT VFR BLD AUTO: 46.4 % (ref 34–46.6)
HGB BLD-MCNC: 13.9 G/DL (ref 12–15.9)
HGB UR QL STRIP.AUTO: ABNORMAL
HOLD SPECIMEN: NORMAL
HYALINE CASTS UR QL AUTO: ABNORMAL /LPF
IMM GRANULOCYTES # BLD AUTO: 0.09 10*3/MM3 (ref 0–0.05)
IMM GRANULOCYTES NFR BLD AUTO: 0.6 % (ref 0–0.5)
KETONES UR QL STRIP: ABNORMAL
LEUKOCYTE ESTERASE UR QL STRIP.AUTO: NEGATIVE
LIPASE SERPL-CCNC: 15 U/L (ref 13–60)
LYMPHOCYTES # BLD AUTO: 0.74 10*3/MM3 (ref 0.7–3.1)
LYMPHOCYTES NFR BLD AUTO: 4.9 % (ref 19.6–45.3)
MCH RBC QN AUTO: 22.9 PG (ref 26.6–33)
MCHC RBC AUTO-ENTMCNC: 30 G/DL (ref 31.5–35.7)
MCV RBC AUTO: 76.3 FL (ref 79–97)
MONOCYTES # BLD AUTO: 0.68 10*3/MM3 (ref 0.1–0.9)
MONOCYTES NFR BLD AUTO: 4.5 % (ref 5–12)
MUCOUS THREADS URNS QL MICRO: ABNORMAL /HPF
NEUTROPHILS NFR BLD AUTO: 13.39 10*3/MM3 (ref 1.7–7)
NEUTROPHILS NFR BLD AUTO: 89.6 % (ref 42.7–76)
NITRITE UR QL STRIP: NEGATIVE
NRBC BLD AUTO-RTO: 0 /100 WBC (ref 0–0.2)
PH UR STRIP.AUTO: 5.5 [PH] (ref 5–8)
PLATELET # BLD AUTO: 499 10*3/MM3 (ref 140–450)
PMV BLD AUTO: 9.4 FL (ref 6–12)
POTASSIUM SERPL-SCNC: 4.4 MMOL/L (ref 3.5–5.2)
PROT SERPL-MCNC: 7.6 G/DL (ref 6–8.5)
PROT UR QL STRIP: ABNORMAL
RBC # BLD AUTO: 6.08 10*6/MM3 (ref 3.77–5.28)
RBC # UR STRIP: ABNORMAL /HPF
REF LAB TEST METHOD: ABNORMAL
SODIUM SERPL-SCNC: 139 MMOL/L (ref 136–145)
SP GR UR STRIP: 1.02 (ref 1–1.03)
SQUAMOUS #/AREA URNS HPF: ABNORMAL /HPF
TROPONIN T SERPL-MCNC: <0.01 NG/ML (ref 0–0.03)
UROBILINOGEN UR QL STRIP: ABNORMAL
WBC # UR STRIP: ABNORMAL /HPF
WBC NRBC COR # BLD: 14.96 10*3/MM3 (ref 3.4–10.8)
WHOLE BLOOD HOLD COAG: NORMAL
WHOLE BLOOD HOLD SPECIMEN: NORMAL

## 2022-07-10 PROCEDURE — 25010000002 ONDANSETRON PER 1 MG

## 2022-07-10 PROCEDURE — 96374 THER/PROPH/DIAG INJ IV PUSH: CPT

## 2022-07-10 PROCEDURE — 87040 BLOOD CULTURE FOR BACTERIA: CPT | Performed by: FAMILY MEDICINE

## 2022-07-10 PROCEDURE — 36415 COLL VENOUS BLD VENIPUNCTURE: CPT

## 2022-07-10 PROCEDURE — 84484 ASSAY OF TROPONIN QUANT: CPT | Performed by: NURSE PRACTITIONER

## 2022-07-10 PROCEDURE — 76000 FLUOROSCOPY <1 HR PHYS/QHP: CPT

## 2022-07-10 PROCEDURE — 99284 EMERGENCY DEPT VISIT MOD MDM: CPT

## 2022-07-10 PROCEDURE — 80053 COMPREHEN METABOLIC PANEL: CPT | Performed by: EMERGENCY MEDICINE

## 2022-07-10 PROCEDURE — 81001 URINALYSIS AUTO W/SCOPE: CPT | Performed by: EMERGENCY MEDICINE

## 2022-07-10 PROCEDURE — 25010000002 ONDANSETRON PER 1 MG: Performed by: NURSE PRACTITIONER

## 2022-07-10 PROCEDURE — 74176 CT ABD & PELVIS W/O CONTRAST: CPT

## 2022-07-10 PROCEDURE — C1769 GUIDE WIRE: HCPCS | Performed by: UROLOGY

## 2022-07-10 PROCEDURE — 96375 TX/PRO/DX INJ NEW DRUG ADDON: CPT

## 2022-07-10 PROCEDURE — 87086 URINE CULTURE/COLONY COUNT: CPT | Performed by: NURSE PRACTITIONER

## 2022-07-10 PROCEDURE — C2617 STENT, NON-COR, TEM W/O DEL: HCPCS | Performed by: UROLOGY

## 2022-07-10 PROCEDURE — 93005 ELECTROCARDIOGRAM TRACING: CPT | Performed by: NURSE PRACTITIONER

## 2022-07-10 PROCEDURE — 25010000002 DEXAMETHASONE PER 1 MG

## 2022-07-10 PROCEDURE — 25010000002 CEFTRIAXONE PER 250 MG: Performed by: NURSE PRACTITIONER

## 2022-07-10 PROCEDURE — 83605 ASSAY OF LACTIC ACID: CPT | Performed by: EMERGENCY MEDICINE

## 2022-07-10 PROCEDURE — 83690 ASSAY OF LIPASE: CPT | Performed by: EMERGENCY MEDICINE

## 2022-07-10 PROCEDURE — 25010000002 HYDROMORPHONE PER 4 MG: Performed by: EMERGENCY MEDICINE

## 2022-07-10 PROCEDURE — 85025 COMPLETE CBC W/AUTO DIFF WBC: CPT | Performed by: EMERGENCY MEDICINE

## 2022-07-10 PROCEDURE — 99223 1ST HOSP IP/OBS HIGH 75: CPT | Performed by: FAMILY MEDICINE

## 2022-07-10 PROCEDURE — 25010000002 PROPOFOL 10 MG/ML EMULSION

## 2022-07-10 PROCEDURE — 96376 TX/PRO/DX INJ SAME DRUG ADON: CPT

## 2022-07-10 DEVICE — URETERAL STENT
Type: IMPLANTABLE DEVICE | Site: URETER | Status: FUNCTIONAL
Brand: PERCUFLEX™ PLUS

## 2022-07-10 RX ORDER — PANTOPRAZOLE SODIUM 40 MG/1
40 TABLET, DELAYED RELEASE ORAL EVERY MORNING
Status: DISCONTINUED | OUTPATIENT
Start: 2022-07-11 | End: 2022-07-11 | Stop reason: HOSPADM

## 2022-07-10 RX ORDER — SODIUM CHLORIDE 0.9 % (FLUSH) 0.9 %
10 SYRINGE (ML) INJECTION AS NEEDED
Status: DISCONTINUED | OUTPATIENT
Start: 2022-07-10 | End: 2022-07-11 | Stop reason: HOSPADM

## 2022-07-10 RX ORDER — SODIUM CHLORIDE, SODIUM LACTATE, POTASSIUM CHLORIDE, CALCIUM CHLORIDE 600; 310; 30; 20 MG/100ML; MG/100ML; MG/100ML; MG/100ML
9 INJECTION, SOLUTION INTRAVENOUS CONTINUOUS
Status: DISCONTINUED | OUTPATIENT
Start: 2022-07-10 | End: 2022-07-11 | Stop reason: HOSPADM

## 2022-07-10 RX ORDER — FENTANYL CITRATE 50 UG/ML
50 INJECTION, SOLUTION INTRAMUSCULAR; INTRAVENOUS
Status: DISCONTINUED | OUTPATIENT
Start: 2022-07-10 | End: 2022-07-10 | Stop reason: HOSPADM

## 2022-07-10 RX ORDER — METOPROLOL SUCCINATE 25 MG/1
25 TABLET, EXTENDED RELEASE ORAL DAILY
Status: DISCONTINUED | OUTPATIENT
Start: 2022-07-11 | End: 2022-07-11 | Stop reason: HOSPADM

## 2022-07-10 RX ORDER — PROPOFOL 10 MG/ML
VIAL (ML) INTRAVENOUS AS NEEDED
Status: DISCONTINUED | OUTPATIENT
Start: 2022-07-10 | End: 2022-07-10 | Stop reason: SURG

## 2022-07-10 RX ORDER — SODIUM CHLORIDE 0.9 % (FLUSH) 0.9 %
3-10 SYRINGE (ML) INJECTION AS NEEDED
Status: DISCONTINUED | OUTPATIENT
Start: 2022-07-10 | End: 2022-07-10 | Stop reason: HOSPADM

## 2022-07-10 RX ORDER — IPRATROPIUM BROMIDE AND ALBUTEROL SULFATE 2.5; .5 MG/3ML; MG/3ML
3 SOLUTION RESPIRATORY (INHALATION) ONCE AS NEEDED
Status: DISCONTINUED | OUTPATIENT
Start: 2022-07-10 | End: 2022-07-10 | Stop reason: HOSPADM

## 2022-07-10 RX ORDER — SODIUM CHLORIDE 9 MG/ML
100 INJECTION, SOLUTION INTRAVENOUS CONTINUOUS
Status: DISCONTINUED | OUTPATIENT
Start: 2022-07-10 | End: 2022-07-11 | Stop reason: HOSPADM

## 2022-07-10 RX ORDER — FAMOTIDINE 10 MG/ML
20 INJECTION, SOLUTION INTRAVENOUS ONCE
Status: COMPLETED | OUTPATIENT
Start: 2022-07-10 | End: 2022-07-10

## 2022-07-10 RX ORDER — SODIUM CHLORIDE 0.9 % (FLUSH) 0.9 %
10 SYRINGE (ML) INJECTION EVERY 12 HOURS SCHEDULED
Status: DISCONTINUED | OUTPATIENT
Start: 2022-07-10 | End: 2022-07-10 | Stop reason: HOSPADM

## 2022-07-10 RX ORDER — ONDANSETRON 2 MG/ML
4 INJECTION INTRAMUSCULAR; INTRAVENOUS EVERY 6 HOURS PRN
Status: DISCONTINUED | OUTPATIENT
Start: 2022-07-10 | End: 2022-07-11 | Stop reason: HOSPADM

## 2022-07-10 RX ORDER — ONDANSETRON 2 MG/ML
4 INJECTION INTRAMUSCULAR; INTRAVENOUS ONCE AS NEEDED
Status: DISCONTINUED | OUTPATIENT
Start: 2022-07-10 | End: 2022-07-10 | Stop reason: HOSPADM

## 2022-07-10 RX ORDER — NALOXONE HCL 0.4 MG/ML
0.4 VIAL (ML) INJECTION AS NEEDED
Status: DISCONTINUED | OUTPATIENT
Start: 2022-07-10 | End: 2022-07-10 | Stop reason: HOSPADM

## 2022-07-10 RX ORDER — LIDOCAINE HYDROCHLORIDE 10 MG/ML
0.5 INJECTION, SOLUTION EPIDURAL; INFILTRATION; INTRACAUDAL; PERINEURAL ONCE AS NEEDED
Status: DISCONTINUED | OUTPATIENT
Start: 2022-07-10 | End: 2022-07-10 | Stop reason: HOSPADM

## 2022-07-10 RX ORDER — SODIUM CHLORIDE 0.9 % (FLUSH) 0.9 %
3 SYRINGE (ML) INJECTION EVERY 12 HOURS SCHEDULED
Status: DISCONTINUED | OUTPATIENT
Start: 2022-07-10 | End: 2022-07-10 | Stop reason: HOSPADM

## 2022-07-10 RX ORDER — SODIUM CHLORIDE 9 MG/ML
10 INJECTION INTRAVENOUS AS NEEDED
Status: DISCONTINUED | OUTPATIENT
Start: 2022-07-10 | End: 2022-07-11 | Stop reason: HOSPADM

## 2022-07-10 RX ORDER — FAMOTIDINE 20 MG/1
20 TABLET, FILM COATED ORAL ONCE
Status: DISCONTINUED | OUTPATIENT
Start: 2022-07-10 | End: 2022-07-10 | Stop reason: HOSPADM

## 2022-07-10 RX ORDER — SODIUM CHLORIDE 0.9 % (FLUSH) 0.9 %
10 SYRINGE (ML) INJECTION AS NEEDED
Status: DISCONTINUED | OUTPATIENT
Start: 2022-07-10 | End: 2022-07-10 | Stop reason: HOSPADM

## 2022-07-10 RX ORDER — FAMOTIDINE 10 MG/ML
INJECTION, SOLUTION INTRAVENOUS
Status: DISPENSED
Start: 2022-07-10 | End: 2022-07-11

## 2022-07-10 RX ORDER — DROPERIDOL 2.5 MG/ML
0.62 INJECTION, SOLUTION INTRAMUSCULAR; INTRAVENOUS ONCE AS NEEDED
Status: DISCONTINUED | OUTPATIENT
Start: 2022-07-10 | End: 2022-07-10 | Stop reason: HOSPADM

## 2022-07-10 RX ORDER — SODIUM CHLORIDE 0.9 % (FLUSH) 0.9 %
10 SYRINGE (ML) INJECTION EVERY 12 HOURS SCHEDULED
Status: DISCONTINUED | OUTPATIENT
Start: 2022-07-10 | End: 2022-07-11 | Stop reason: HOSPADM

## 2022-07-10 RX ORDER — PROMETHAZINE HYDROCHLORIDE 25 MG/1
25 SUPPOSITORY RECTAL ONCE AS NEEDED
Status: DISCONTINUED | OUTPATIENT
Start: 2022-07-10 | End: 2022-07-10 | Stop reason: HOSPADM

## 2022-07-10 RX ORDER — DROPERIDOL 2.5 MG/ML
0.62 INJECTION, SOLUTION INTRAMUSCULAR; INTRAVENOUS
Status: DISCONTINUED | OUTPATIENT
Start: 2022-07-10 | End: 2022-07-10 | Stop reason: HOSPADM

## 2022-07-10 RX ORDER — HYDRALAZINE HYDROCHLORIDE 20 MG/ML
5 INJECTION INTRAMUSCULAR; INTRAVENOUS
Status: DISCONTINUED | OUTPATIENT
Start: 2022-07-10 | End: 2022-07-10 | Stop reason: HOSPADM

## 2022-07-10 RX ORDER — HYDROMORPHONE HYDROCHLORIDE 1 MG/ML
0.5 INJECTION, SOLUTION INTRAMUSCULAR; INTRAVENOUS; SUBCUTANEOUS
Status: DISCONTINUED | OUTPATIENT
Start: 2022-07-10 | End: 2022-07-10 | Stop reason: HOSPADM

## 2022-07-10 RX ORDER — ONDANSETRON 2 MG/ML
INJECTION INTRAMUSCULAR; INTRAVENOUS AS NEEDED
Status: DISCONTINUED | OUTPATIENT
Start: 2022-07-10 | End: 2022-07-10 | Stop reason: SURG

## 2022-07-10 RX ORDER — ONDANSETRON 4 MG/1
4 TABLET, FILM COATED ORAL EVERY 6 HOURS PRN
Status: DISCONTINUED | OUTPATIENT
Start: 2022-07-10 | End: 2022-07-11 | Stop reason: HOSPADM

## 2022-07-10 RX ORDER — LABETALOL HYDROCHLORIDE 5 MG/ML
5 INJECTION, SOLUTION INTRAVENOUS
Status: DISCONTINUED | OUTPATIENT
Start: 2022-07-10 | End: 2022-07-10 | Stop reason: HOSPADM

## 2022-07-10 RX ORDER — ACETAMINOPHEN 325 MG/1
650 TABLET ORAL EVERY 4 HOURS PRN
Status: DISCONTINUED | OUTPATIENT
Start: 2022-07-10 | End: 2022-07-11 | Stop reason: HOSPADM

## 2022-07-10 RX ORDER — PROMETHAZINE HYDROCHLORIDE 25 MG/1
25 TABLET ORAL ONCE AS NEEDED
Status: DISCONTINUED | OUTPATIENT
Start: 2022-07-10 | End: 2022-07-10 | Stop reason: HOSPADM

## 2022-07-10 RX ORDER — LIDOCAINE HYDROCHLORIDE 20 MG/ML
INJECTION, SOLUTION INFILTRATION; PERINEURAL AS NEEDED
Status: DISCONTINUED | OUTPATIENT
Start: 2022-07-10 | End: 2022-07-10 | Stop reason: SURG

## 2022-07-10 RX ORDER — HYDROMORPHONE HYDROCHLORIDE 1 MG/ML
0.5 INJECTION, SOLUTION INTRAMUSCULAR; INTRAVENOUS; SUBCUTANEOUS ONCE
Status: COMPLETED | OUTPATIENT
Start: 2022-07-10 | End: 2022-07-10

## 2022-07-10 RX ORDER — ONDANSETRON 2 MG/ML
4 INJECTION INTRAMUSCULAR; INTRAVENOUS ONCE
Status: COMPLETED | OUTPATIENT
Start: 2022-07-10 | End: 2022-07-10

## 2022-07-10 RX ORDER — DEXAMETHASONE SODIUM PHOSPHATE 10 MG/ML
INJECTION INTRAMUSCULAR; INTRAVENOUS AS NEEDED
Status: DISCONTINUED | OUTPATIENT
Start: 2022-07-10 | End: 2022-07-10 | Stop reason: SURG

## 2022-07-10 RX ORDER — MIDAZOLAM HYDROCHLORIDE 1 MG/ML
0.5 INJECTION INTRAMUSCULAR; INTRAVENOUS
Status: DISCONTINUED | OUTPATIENT
Start: 2022-07-10 | End: 2022-07-10 | Stop reason: HOSPADM

## 2022-07-10 RX ORDER — BUPIVACAINE HCL/0.9 % NACL/PF 0.125 %
PLASTIC BAG, INJECTION (ML) EPIDURAL AS NEEDED
Status: DISCONTINUED | OUTPATIENT
Start: 2022-07-10 | End: 2022-07-10 | Stop reason: SURG

## 2022-07-10 RX ORDER — AMIODARONE HYDROCHLORIDE 200 MG/1
200 TABLET ORAL DAILY
Status: DISCONTINUED | OUTPATIENT
Start: 2022-07-10 | End: 2022-07-11 | Stop reason: HOSPADM

## 2022-07-10 RX ORDER — HYDROCODONE BITARTRATE AND ACETAMINOPHEN 5; 325 MG/1; MG/1
1 TABLET ORAL EVERY 4 HOURS PRN
Status: DISCONTINUED | OUTPATIENT
Start: 2022-07-10 | End: 2022-07-11 | Stop reason: HOSPADM

## 2022-07-10 RX ORDER — HYDROCODONE BITARTRATE AND ACETAMINOPHEN 5; 325 MG/1; MG/1
1 TABLET ORAL ONCE AS NEEDED
Status: DISCONTINUED | OUTPATIENT
Start: 2022-07-10 | End: 2022-07-10 | Stop reason: HOSPADM

## 2022-07-10 RX ADMIN — SODIUM CHLORIDE, POTASSIUM CHLORIDE, SODIUM LACTATE AND CALCIUM CHLORIDE 9 ML/HR: 600; 310; 30; 20 INJECTION, SOLUTION INTRAVENOUS at 16:04

## 2022-07-10 RX ADMIN — LIDOCAINE HYDROCHLORIDE 50 MG: 20 INJECTION, SOLUTION INFILTRATION; PERINEURAL at 16:11

## 2022-07-10 RX ADMIN — SODIUM CHLORIDE 1000 ML: 9 INJECTION, SOLUTION INTRAVENOUS at 10:01

## 2022-07-10 RX ADMIN — Medication 100 MCG: at 16:29

## 2022-07-10 RX ADMIN — Medication 100 MCG: at 16:26

## 2022-07-10 RX ADMIN — HYDROMORPHONE HYDROCHLORIDE 0.5 MG: 1 INJECTION, SOLUTION INTRAMUSCULAR; INTRAVENOUS; SUBCUTANEOUS at 12:47

## 2022-07-10 RX ADMIN — SODIUM CHLORIDE 1000 ML: 9 INJECTION, SOLUTION INTRAVENOUS at 12:36

## 2022-07-10 RX ADMIN — Medication 10 ML: at 21:32

## 2022-07-10 RX ADMIN — ONDANSETRON 4 MG: 2 INJECTION INTRAMUSCULAR; INTRAVENOUS at 16:20

## 2022-07-10 RX ADMIN — FAMOTIDINE 20 MG: 10 INJECTION INTRAVENOUS at 16:02

## 2022-07-10 RX ADMIN — ONDANSETRON 4 MG: 2 INJECTION INTRAMUSCULAR; INTRAVENOUS at 10:02

## 2022-07-10 RX ADMIN — PROPOFOL 200 MG: 10 INJECTION, EMULSION INTRAVENOUS at 16:11

## 2022-07-10 RX ADMIN — DEXAMETHASONE SODIUM PHOSPHATE 4 MG: 10 INJECTION INTRAMUSCULAR; INTRAVENOUS at 16:20

## 2022-07-10 RX ADMIN — SODIUM CHLORIDE 2 G: 900 INJECTION INTRAVENOUS at 15:26

## 2022-07-10 RX ADMIN — HYDROCODONE BITARTRATE AND ACETAMINOPHEN 1 TABLET: 5; 325 TABLET ORAL at 21:43

## 2022-07-10 RX ADMIN — SODIUM CHLORIDE 100 ML/HR: 9 INJECTION, SOLUTION INTRAVENOUS at 18:28

## 2022-07-10 RX ADMIN — HYDROMORPHONE HYDROCHLORIDE 0.5 MG: 1 INJECTION, SOLUTION INTRAMUSCULAR; INTRAVENOUS; SUBCUTANEOUS at 10:21

## 2022-07-10 NOTE — ANESTHESIA POSTPROCEDURE EVALUATION
Patient: Zulema Jarquin    Procedure Summary     Date: 07/10/22 Room / Location:  [facility] / [facility]    Anesthesia Start: 1608 Anesthesia Stop:     Procedure: CYSTOSCOPY URETERAL CATHETER/STENT INSERTION (N/A ) Diagnosis:     Surgeons: Addi Vasquez MD Provider: Eris Chang MD    Anesthesia Type: general ASA Status: 2          Anesthesia Type: general    Vitals  Vitals Value Taken Time   /70    Temp 98.6    Pulse 78    Resp 14    SpO2 100 % 07/10/22 1639   Vitals shown include unvalidated device data.        Post Anesthesia Care and Evaluation    Patient location during evaluation: PACU  Patient participation: complete - patient participated  Level of consciousness: awake and alert  Pain management: adequate    Airway patency: patent  Anesthetic complications: No anesthetic complications  PONV Status: none  Cardiovascular status: hemodynamically stable and acceptable  Respiratory status: nonlabored ventilation, acceptable and nasal cannula  Hydration status: acceptable

## 2022-07-10 NOTE — BRIEF OP NOTE
CYSTOSCOPY URETERAL CATHETER/STENT INSERTION  Progress Note    Zulema M Britton  7/10/2022    Pre-op Diagnosis:   Right ureteral stone, concern for UTI       Post-Op Diagnosis Codes:  Right ureteral stone, concern for UTI    Procedure/CPT® Codes:        Procedure(s):  Cystoscopy, right ureteral stent placement    Surgeon(s):  Addi Vasquez MD    Anesthesia: Choice    Staff:   Circulator: Anu Almonte RN  Radiology Technologist: Nishi Trinh RT  Scrub Person: Noelle Ashton         Estimated Blood Loss: none    Urine Voided: * No values recorded between 7/10/2022  4:08 PM and 7/10/2022  4:29 PM *    Specimens:                None          Drains:   Gastrostomy/Enterostomy 1 (Active)   Surrounding Skin Dry;Intact 07/01/22 0800   Securement sutured to abdomen 07/01/22 0800   Drain Status Clamped 07/01/22 0800   Drainage Appearance None 07/01/22 0800   Catheter Position (cm marking) 3.5 cm 06/28/22 1800   Site Description Unable to view 07/01/22 0800   Gastrostomy/Enterostomy Site Interventions Site assessed 07/01/22 0900   Dressing Status Clean;Dry;Intact 07/01/22 0800   Flush/ Irrigation Intake (mL) 60 06/30/22 2000   Output (mL) 200 mL 06/29/22 0608       Findings: 6 Indian by 24 cm stent placed with good coil seen proximally distally        Complications: None          Addi Vasquez MD     Date: 7/10/2022  Time: 16:33 EDT

## 2022-07-10 NOTE — ANESTHESIA PREPROCEDURE EVALUATION
Anesthesia Evaluation     Patient summary reviewed and Nursing notes reviewed   NPO Solid Status: > 8 hours  NPO Liquid Status: > 8 hours           Airway   Mallampati: II  TM distance: >3 FB  Neck ROM: full  No difficulty expected  Dental - normal exam     Pulmonary     breath sounds clear to auscultation  Cardiovascular   Exercise tolerance: good (4-7 METS)    Rhythm: regular  Rate: normal        Neuro/Psych  GI/Hepatic/Renal/Endo      Musculoskeletal     Abdominal    Substance History      OB/GYN          Other                        Anesthesia Plan    ASA 2     general     intravenous induction     Anesthetic plan, risks, benefits, and alternatives have been provided, discussed and informed consent has been obtained with: patient.        CODE STATUS:

## 2022-07-10 NOTE — H&P
[facility] Medicine Services  HISTORY AND PHYSICAL    Patient Name: Zulema Jarquin  : 1955  MRN: [id number]  Primary Care Physician: Micha Hernandez MD  Date of admission: 7/10/2022      Subjective   Subjective     Chief Complaint:  Abdominal pain    HPI:  Zulema Jarquin is a 66 y.o. female who presented to the emergency department complaining of sudden onset of abdominal pain with nausea.  Of note several days ago she underwent paraesophageal hernia repair with Dr. Tristan with a PEG stabilization tube placed.  She has been on a liquid diet since her surgery.  Patient reports she has been doing pretty well however her her pain acutely worsened over the past 24 hours.  It started on the left upper quadrant and moved all the way across to the right side and across to her right flank.  Pain is exacerbated by movement.  Pain is alleviated by pain medication.  She reports several episodes of emesis.  She denies fever.  She endorses difficulty urinating and burning and pain with urination.  She reports right-sided flank pain.    From the emergency room she was found to have an acute splenic infarct as well as acute ureteral obstruction on the right UPJ.  She went directly from the ER to the OR with urology for surgery.    Of note the patient previously tested positive for COVID-19 May 29, 2022.      Review of Systems   General: No fever, chills, fatigue  ENT: No sore throat, trouble swallowing or changes in vision  Respiratory: No shortness of breath, cough, wheezing or fast breathing  Cardiovascular: No chest pain, palpitations, dyspnea with exertion  Gastrointestinal: Positive nausea, vomiting, abdominal pain  Musculoskeletal: No difficulty walking, positive weakness  Vascular: No cyanosis or clubbing  Lymphatic: No peripheral edema, no lymphadenopathy  Neurologic: No headache, confusion, dizziness  Psychiatric: No changes in mood.  No depression or anxiety.    All other systems  reviewed and are negative.     Personal History     Past Medical History:   Diagnosis Date   • Anemia    • Arthritis    • GERD (gastroesophageal reflux disease)    • Headache    • Heart murmur     not always detected, history of Mitral Valve prolapse ~1980 was prescribed meds saw a cardiologist and since has been d/c'd from care and meds discontinued per cardiology several years ago   • History of transfusion 2020    due to anemia, no reaction   • Hypertension     not currently prescribed meds   • Kidney stones     H/O   • PONV (postoperative nausea and vomiting)    • Rheumatic fever    • Ulcer, stomach peptic    • Wears glasses              Past Surgical History:   Procedure Laterality Date   • CARPAL TUNNEL RELEASE Right 1999   • COLONOSCOPY  07/2019   • ENDOSCOPY N/A 7/5/2019    Procedure: ESOPHAGOGASTRODUODENOSCOPY;  Surgeon: Brunner, Mark I, MD;  Location:  [facility];  Service: Gastroenterology   • ESOPHAGEAL MOTILITY STUDY N/A 1/11/2022    Procedure: ESOPHAGEAL MOTILITY STUDY;  Surgeon: Kamari Tristan MD;  Location:  [facility];  Service: General;  Laterality: N/A;  Patient tolerated esophageal manometry well. Probe is advanced to 38cm with LES showing at 31.5cm.   • PARAESOPHAGEAL HERNIA REPAIR N/A 6/28/2022    Procedure: PARAESOPHAGEAL HERNIA REPAIR LAPAROSCOPIC, WITH MESH, NISSEN , PEG/EGD;  Surgeon: Kamari Tristan MD;  Location:  [facility];  Service: General;  Laterality: N/A;   • TUBAL ABDOMINAL LIGATION  1983       Family History: No recent sick contacts family history is not on file. Otherwise pertinent FHx was reviewed and unremarkable.     Social History:  reports that she has never smoked. She has never used smokeless tobacco. She reports current alcohol use. She reports that she does not use drugs.  Social History     Social History Narrative   • Not on file       Medications:  Available home medication information reviewed.  Medications Prior to Admission   Medication Sig Dispense  Refill Last Dose   • amiodarone (PACERONE) 200 MG tablet Take 1 tablet by mouth Daily. 14 tablet 0 2022 at Unknown time   • Cholecalciferol (vitamin D3) 125 MCG (5000 UT) tablet tablet Take 5,000 Units by mouth Daily.   2022 at Unknown time   • docusate (COLACE) 50 MG/5ML liquid Take 10 mL by mouth Daily. 200 mL 0 2022 at Unknown time   • metoprolol succinate XL (TOPROL-XL) 25 MG 24 hr tablet Take 1 tablet by mouth Daily. 30 tablet 11 2022 at Unknown time   • omeprazole (priLOSEC) 40 MG capsule Take 20 mg by mouth Daily.   2022 at Unknown time   • [] ondansetron (ZOFRAN) 4 MG tablet Take 1 tablet by mouth Every 6 (Six) Hours As Needed for Nausea or Vomiting for up to 2 days. 30 tablet 1 Past Week at Unknown time       Allergies   Allergen Reactions   • Molds & Smuts Shortness Of Breath and Cough   • Amoxicillin-Pot Clavulanate GI Intolerance, Diarrhea and Nausea Only       Objective   Objective     Vital Signs:   Temp:  [97.6 °F (36.4 °C)-98.2 °F (36.8 °C)] 98.2 °F (36.8 °C)  Heart Rate:  [67-86] 86  Resp:  [16-18] 16  BP: (122-190)/(65-96) 149/75       Physical Exam   Constitutional: No acute distress, awake, alert, nontoxic, elderly body habitus  Eyes: Pupils equal, sclerae anicteric, no conjunctival injection  HENT: NCAT, mucous membranes moist  Neck: Supple, no thyromegaly, no lymphadenopathy  Respiratory: Clear to auscultation bilaterally, good effort, nonlabored respirations   Cardiovascular: RRR  Gastrointestinal: Positive bowel sounds, soft, moderately tender, mildly distended  Musculoskeletal: No peripheral edema, normal muscle tone for age  Psychiatric: Appropriate affect, good insight and judgement, cooperative  Neurologic: Oriented x 3, movements symmetric BUE and BLE, Cranial Nerves grossly intact, speech clear and fluent  Skin: No rashes, no jaundice, no petechiae, no mottling      Result Review:  I have personally reviewed the results from the time of this admission to  7/10/2022 16:35 EDT and agree with these findings:  [x]  Laboratory list / accordion  [x]  Microbiology  [x]  Radiology  []  EKG/Telemetry   []  Cardiology/Vascular   []  Pathology  []  Old records  []  Other:  Most notable findings include: Anion gap 17, creatinine 1.27, liver enzymes normal, lactate initially 2.6, improved to 1.1, white blood cell count 14.96; UA positive white blood cells, 1+ bacteria; CT abdomen pelvis- moderate sized splenic infarct, age-indeterminate, mild obstructive uropathy on the right with a 5 mm calculus just below the right UPJ, bilateral nonobstructing renal calculi, bilateral small effusions        LAB RESULTS:      Lab 07/10/22  1201 07/10/22  0842   WBC  --  14.96*   HEMOGLOBIN  --  13.9   HEMATOCRIT  --  46.4   PLATELETS  --  499*   NEUTROS ABS  --  13.39*   IMMATURE GRANS (ABS)  --  0.09*   LYMPHS ABS  --  0.74   MONOS ABS  --  0.68   EOS ABS  --  0.01   MCV  --  76.3*   LACTATE 1.1 2.6*         Lab 07/10/22  0842   SODIUM 139   POTASSIUM 4.4   CHLORIDE 103   CO2 19.0*   ANION GAP 17.0*   BUN 13   CREATININE 1.27*   EGFR 46.2*   GLUCOSE 193*   CALCIUM 10.2         Lab 07/10/22  0842   TOTAL PROTEIN 7.6   ALBUMIN 3.90   GLOBULIN 3.7   ALT (SGPT) 11   AST (SGOT) 15   BILIRUBIN 0.4   ALK PHOS 97   LIPASE 15         Lab 07/10/22  0842   TROPONIN T <0.010                 UA    Urinalysis 7/10/22 7/10/22    1250 1250   Squamous Epithelial Cells, UA  0-2   Specific Gravity, UA 1.019    Ketones, UA Trace (A)    Blood, UA Large (3+) (A)    Leukocytes, UA Negative    Nitrite, UA Negative    RBC, UA  Too Numerous to Count (A)   WBC, UA  6-12 (A)   Bacteria, UA  1+ (A)   (A) Abnormal value              Microbiology Results (last 10 days)     ** No results found for the last 240 hours. **          CT Abdomen Pelvis Without Contrast    Result Date: 7/10/2022  CT ABDOMEN PELVIS WO CONTRAST-  Date of Exam: 7/10/2022 9:10 AM  Indication: abd pain recent surgery for hernia repair and peg placement.   Comparison: Upper GI 6/29/2022  Technique: Contiguous axial CT images were obtained from the lung bases to the to the pubic symphysis without contrast.  Sagittal and coronal reconstructions were performed.  Automated exposure control and iterative reconstruction methods were used.    FINDINGS:  Lower Chest: Small bilateral pleural effusions and minimal dependent atelectasis noted at the lung bases.  Postsurgical changes at the GE junction noted from Nissen fundoplication repair of paraesophageal hernia. Fluid is noted at the GE junction as well as increased density material compatible with postoperative mesh. No evidence of extraluminal air noted.  Organs: Geographic area of low attenuation involving the superior medial aspect of the spleen compatible with underlying splenic infarct. Remainder the spleen is grossly unremarkable in appearance.  Limited noncontrast imaging of the liver, pancreas and adrenal glands are grossly unremarkable in appearance.  Increased density within the gallbladder is noted which may represent stones or sludge  Punctate nonobstructing calculi within the kidneys bilaterally measuring up to 3 mm on the left and 2 mm on the right.  There is mild obstructive uropathy on the right secondary to a 5 mm calculus just below the right UPJ. No calculi along the ureters otherwise noted.  GI/Bowel: Postsurgical changes at the GE junction noted. Limited noncontrast imaging of the stomach demonstrates a percutaneous gastrostomy tube in expected position. No abnormal fluid collection along the percutaneous tract. Small bowel demonstrates no evidence of acute abnormality. No suspicious mesenteric adenopathy or fluid collection noted.  There is diverticulosis without evidence of acute diverticulitis. Surgical clips are noted within the left upper quadrant.  Ileocecal valve and appendix appear unremarkable  Pelvis: Bladder is incompletely distended and grossly unremarkable  Limited noncontrast imaging of  the uterus and ovaries are unremarkable  Peritoneum/Retroperitoneum: The aorta is normal in caliber. No suspicious retroperitoneal adenopathy  Bones/Soft Tissues: No destructive bone lesion noted. Multilevel degenerative changes of the spine.      Impression:  1. Postsurgical changes from recent paraesophageal hernia repair. No evidence of extraperitoneal air. 2. Moderate sized area of low-attenuation within the superior medial aspect of the spleen suggesting splenic infarct. This is age indeterminate but may be source of patient's pain. 3. There is mild obstructive uropathy on the right secondary to a 5 mm calculus just below the right UPJ. 4. Bilateral nonobstructing renal calculi 5. Diverticulosis without evidence of diverticulitis 6. Gastrostomy tube projects in expected position. 7. Bilateral small pleural effusions. 8. Other incidental findings as above   This report was finalized on 7/10/2022 10:21 AM by Ochoa Oden.        Results for orders placed during the hospital encounter of 06/28/22    Adult Transthoracic Echo Complete W/ Cont if Necessary Per Protocol    Interpretation Summary  · Left ventricular ejection fraction appears to be 56 - 60%. Left ventricular systolic function is normal.  · Left ventricular wall thickness is consistent with borderline concentric hypertrophy.  · Left atrial volume is mildly increased.  · The right atrial cavity is borderline dilated.  · Mild tricuspid valve regurgitation is present.      Assessment & Plan   Assessment & Plan     Active Hospital Problems    Diagnosis  POA   • **Splenic infarct [D73.5]  Yes   • Nephrolithiasis [N20.0]  Yes   • Pyelonephritis [N12]  Yes   • Right ureteral stone [N20.1]  Yes   • Presence of externally removable percutaneous endoscopic gastrostomy (PEG) tube ([facility]) [Z93.1]  Not Applicable   • N&V (nausea and vomiting) [R11.2]  Yes   • NATALI (acute kidney injury) ([facility]) [N17.9]  Yes   • Lactic acidosis [E87.2]  Yes   • Atrial flutter ([facility])  [I48.92]  Yes   • Iron deficiency anemia [D50.9]  Yes   • Hernia, hiatal [K44.9]  Yes       Patient is a 66-year-old woman admitted with age-indeterminate splenic infarct along with right ureteral stone, bilateral nephrolithiasis with concern for acute right pyelonephritis.  Of note the patient's past medical history includes atrial flutter and iron deficiency anemia.  She underwent paraesophageal hernia repair with Dr. Tristan recently and had a PEG tube placed at that time.    Age-indeterminate splenic infarct  - Held anticoagulation pending urologic surgery today  -Continue Tylenol and Norco as needed for pain    Nephrolithiasis  Right ureteral stone  Right pyelonephritis  -Patient taken to the OR on 7/10/2022 by urology-Dr. Vasquez  -Received perioperative Rocephin -continue (plan 3 days total) pending urine culture    Lactic acidosis  Nausea and vomiting  -Lactic acidosis has improved after IV fluids  -Antiemetics as needed  - Liquid diet after surgery    Acute kidney injury  -Likely secondary to nausea vomiting and poor p.o. intake  -Should improve after IV fluids  -Continue IV fluids normal saline at 100 an hour  -Repeat labs in the morning    History of atrial flutter  -Continue home meds once confirmed by pharmacy    History of iron deficiency anemia  -Monitor blood count periodically  -Add iron supplement    Hiatal hernia status post paraesophageal repair  Presence of PEG tube  -Courtesy consult to Dr. Tristan tomorrow  - Continue liquid diet    DVT prophylaxis: Venous foot pumps, pending initiation of anticoagulation postsurgery      CODE STATUS: Full  There are no questions and answers to display.         Lynsey Davies MD  07/10/22

## 2022-07-10 NOTE — PLAN OF CARE
Goal Outcome Evaluation:              Outcome Evaluation: Pt AXO, VSS, Pt has been up to the toliet with min assist 400 output, Room air, NSR, NS at 100 mL/hr, lab attmepted the blood culture once and was unscucessful paged lab again at 1858 to get blood culture still pending arrival, will contniue to monitor.

## 2022-07-10 NOTE — ED PROVIDER NOTES
Subjective   Pleasant patient presents the ER for 1 day worth of diffuse abdominal pain.  She recently had a abdominal hernia surgery with a PEG stabilization tube placed.  She is currently on a liquid diet.  Her surgeon is Dr. Tristan.  She denies any chest pain or fever.  She has had fairly persistent nausea vomiting.      Abdominal Pain  Pain location:  Generalized  Pain quality: aching and cramping    Pain radiates to:  R flank and L flank  Pain severity:  Severe  Onset quality:  Sudden  Duration: 1 day.  Timing:  Constant  Progression:  Waxing and waning  Chronicity:  New  Relieved by:  Nothing  Worsened by:  Nothing  Associated symptoms: nausea and vomiting    Associated symptoms: no chest pain, no chills, no constipation, no cough, no diarrhea, no dysuria, no fever, no hematuria, no shortness of breath and no sore throat    Risk factors: being elderly, multiple surgeries and recent hospitalization        Review of Systems   Constitutional: Negative for chills, diaphoresis and fever.   HENT: Negative for congestion and sore throat.    Respiratory: Negative for cough, choking, chest tightness, shortness of breath and wheezing.    Cardiovascular: Negative for chest pain and leg swelling.   Gastrointestinal: Positive for abdominal pain, nausea and vomiting. Negative for abdominal distention, anal bleeding, blood in stool, constipation and diarrhea.   Genitourinary: Positive for flank pain. Negative for difficulty urinating, dysuria, frequency, hematuria and urgency.   All other systems reviewed and are negative.      Past Medical History:   Diagnosis Date   • Anemia    • Arthritis    • GERD (gastroesophageal reflux disease)    • Headache    • Heart murmur     not always detected, history of Mitral Valve prolapse ~1980 was prescribed meds saw a cardiologist and since has been d/c'd from care and meds discontinued per cardiology several years ago   • History of transfusion 2020    due to anemia, no reaction   •  Hypertension     not currently prescribed meds   • Kidney stones     H/O   • PONV (postoperative nausea and vomiting)    • Rheumatic fever    • Ulcer, stomach peptic    • Wears glasses        Allergies   Allergen Reactions   • Molds & Smuts Shortness Of Breath and Cough   • Amoxicillin-Pot Clavulanate GI Intolerance, Diarrhea and Nausea Only       Past Surgical History:   Procedure Laterality Date   • CARPAL TUNNEL RELEASE Right 1999   • COLONOSCOPY  07/2019   • ENDOSCOPY N/A 7/5/2019    Procedure: ESOPHAGOGASTRODUODENOSCOPY;  Surgeon: Brunner, Mark I, MD;  Location:  [facility];  Service: Gastroenterology   • ESOPHAGEAL MOTILITY STUDY N/A 1/11/2022    Procedure: ESOPHAGEAL MOTILITY STUDY;  Surgeon: Kamari Tristan MD;  Location:  [facility];  Service: General;  Laterality: N/A;  Patient tolerated esophageal manometry well. Probe is advanced to 38cm with LES showing at 31.5cm.   • PARAESOPHAGEAL HERNIA REPAIR N/A 6/28/2022    Procedure: PARAESOPHAGEAL HERNIA REPAIR LAPAROSCOPIC, WITH MESH, NISSEN , PEG/EGD;  Surgeon: Kamari Tristan MD;  Location:  [facility];  Service: General;  Laterality: N/A;   • TUBAL ABDOMINAL LIGATION  1983       History reviewed. No pertinent family history.    Social History     Socioeconomic History   • Marital status:    Tobacco Use   • Smoking status: Never Smoker   • Smokeless tobacco: Never Used   Vaping Use   • Vaping Use: Never used   Substance and Sexual Activity   • Alcohol use: Yes     Comment: rarely   • Drug use: No   • Sexual activity: Defer           Objective   Physical Exam  Constitutional:       Appearance: She is well-developed. She is ill-appearing.      Comments: Frail. Elderly appearing.   HENT:      Head: Normocephalic and atraumatic.      Right Ear: External ear normal.      Left Ear: External ear normal.      Nose: Nose normal.   Eyes:      Conjunctiva/sclera: Conjunctivae normal.      Pupils: Pupils are equal, round, and reactive to light.    Cardiovascular:      Rate and Rhythm: Normal rate and regular rhythm.      Heart sounds: Normal heart sounds.   Pulmonary:      Effort: Pulmonary effort is normal.      Breath sounds: Normal breath sounds.   Abdominal:      General: Bowel sounds are normal.      Palpations: Abdomen is soft.      Tenderness: There is abdominal tenderness.   Musculoskeletal:         General: Normal range of motion.      Cervical back: Normal range of motion and neck supple.   Skin:     General: Skin is warm and dry.   Neurological:      Mental Status: She is alert and oriented to person, place, and time.   Psychiatric:         Behavior: Behavior normal.         Thought Content: Thought content normal.         Judgment: Judgment normal.         Procedures           ED Course  ED Course as of 07/10/22 1528   Sun Jul 10, 2022   1327 Awaiting UA   [JM]   1331 I spoke with lab. They do no have the ua. [JM]   1405 Reassuringly her surgery appears okay and her pain is most likely from a ureteral stone or the splenic infarct.  Will need to be admitted for further evaluation. [JM]   1409 Awaiting urine micro. [JM]   [id number] Urology paged [JM]      ED Course User Index  [JM] Davon Mcintosh APRN           CT Abdomen Pelvis Without Contrast   Final Result       1. Postsurgical changes from recent paraesophageal hernia repair. No   evidence of extraperitoneal air.   2. Moderate sized area of low-attenuation within the superior medial   aspect of the spleen suggesting splenic infarct. This is age   indeterminate but may be source of patient's pain.   3. There is mild obstructive uropathy on the right secondary to a 5 mm   calculus just below the right UPJ.   4. Bilateral nonobstructing renal calculi   5. Diverticulosis without evidence of diverticulitis   6. Gastrostomy tube projects in expected position.   7. Bilateral small pleural effusions.   8. Other incidental findings as above           This report was finalized on 7/10/2022 10:21 AM by  Ochoa Oden.                                            [facility]    Final diagnoses:   Splenic infarct   Ureteral stone   Kidney stone   Acute abdominal pain   Lactic acidosis   Leukocytosis, unspecified type       ED Disposition  ED Disposition     ED Disposition   Decision to Admit    Condition   --    Comment   --             No follow-up provider specified.       Medication List      ASK your doctor about these medications    ondansetron 4 MG tablet  Commonly known as: ZOFRAN  Take 1 tablet by mouth Every 6 (Six) Hours As Needed for Nausea or Vomiting for up to 2 days.  Ask about: Should I take this medication?             Davon Mcintosh, APRN  07/10/22 1527

## 2022-07-10 NOTE — ANESTHESIA PROCEDURE NOTES
Airway  Urgency: elective    Date/Time: 7/10/2022 4:12 PM  Airway not difficult    General Information and Staff    Patient location during procedure: OR  CRNA/CAA: Schirmer, Shelley P, CRNA    Indications and Patient Condition  Indications for airway management: airway protection    Preoxygenated: yes  Mask difficulty assessment: 1 - vent by mask    Final Airway Details  Final airway type: supraglottic airway      Successful airway: I-gel  Size 4    Number of attempts at approach: 1  Assessment: lips, teeth, and gum same as pre-op    Additional Comments  LMA placed without difficulty, ventilation with assist, equal breath sounds and symmetric chest rise and fall

## 2022-07-10 NOTE — OP NOTE
Cystoscopy, right ureteral stent placement    Zulema Jarquin  7/10/2022    Pre-op Diagnosis:   Right ureteral stone, concern for UTI       Post-Op Diagnosis Codes:  Right ureteral stone, concern for UTI    Procedure/CPT® Codes:  Procedure(s):  Cystoscopy, right ureteral stent placement, stone manipulation without removal, intraoperative fluoroscopy with interpretation-less than 1 hour    Surgeon(s):  Addi Vasquez MD    Anesthesia: Choice    Staff:   Circulator: Anu Almonte RN  Radiology Technologist: Nishi Trinh RT  Scrub Person: Noelle Ahston     Estimated Blood Loss: none    Urine Voided: * No values recorded between 7/10/2022  4:08 PM and 7/10/2022  4:29 PM *    Specimens:                None    Drains:   Gastrostomy/Enterostomy 1 (Active)   Surrounding Skin Dry;Intact 07/01/22 0800   Securement sutured to abdomen 07/01/22 0800   Drain Status Clamped 07/01/22 0800   Drainage Appearance None 07/01/22 0800   Catheter Position (cm marking) 3.5 cm 06/28/22 1800   Site Description Unable to view 07/01/22 0800   Gastrostomy/Enterostomy Site Interventions Site assessed 07/01/22 0900   Dressing Status Clean;Dry;Intact 07/01/22 0800   Flush/ Irrigation Intake (mL) 60 06/30/22 2000   Output (mL) 200 mL 06/29/22 0608     Findings: 6 Lithuanian by 24 cm stent placed with good coil seen proximally distally    Complications: None    Procedure:  66-year-old female who had presented to the emergency department for evaluation of abdominal pain.  CT abdomen pelvis performed showing proximal right ureteral stone.  Urinalysis concerning for UTI.  Risks and benefits reviewed for cystoscopy and ureteral stent placement with delayed treatment of ureteral stone after treatment of UTI.  Informed consent obtained.  She is taken to the operating room positioned supine position.  Anesthesia induced.  Repositioned to lithotomy position.  All pressure points padded.  Proper timeout procedure completed.  Preoperative antibiotics  have been given.  The genitourinary area is prepped and draped in normal sterile fashion.  Rigid cystoscopy performed.  The right ureteral orifice was identified and cannulated with wire.  The wire was advanced to the renal pelvis.  A 6 Mongolian by 24 cm stent was placed over the wire in retrograde fashion with coil seen proximally and distally by fluoroscopy and cystoscopy respectively.  No string was left on the stent.  Lidocaine gel instilled for local analgesia    Disposition:  To PACU in stable condition.  Inpatient admission per hospitalist.  Follow-up with urology within 1 week after discharge to plan definitive stone treatment.        Addi Vasquez MD     Date: 7/10/2022  Time: 16:35 EDT

## 2022-07-11 ENCOUNTER — READMISSION MANAGEMENT (OUTPATIENT)
Dept: CALL CENTER | Facility: HOSPITAL | Age: 67
End: 2022-07-11

## 2022-07-11 VITALS
DIASTOLIC BLOOD PRESSURE: 89 MMHG | BODY MASS INDEX: 30.21 KG/M2 | RESPIRATION RATE: 16 BRPM | SYSTOLIC BLOOD PRESSURE: 159 MMHG | WEIGHT: 160 LBS | TEMPERATURE: 98.6 F | HEART RATE: 60 BPM | HEIGHT: 61 IN | OXYGEN SATURATION: 95 %

## 2022-07-11 PROBLEM — N17.9 AKI (ACUTE KIDNEY INJURY): Status: RESOLVED | Noted: 2022-07-10 | Resolved: 2022-07-11

## 2022-07-11 PROBLEM — R11.2 N&V (NAUSEA AND VOMITING): Status: RESOLVED | Noted: 2022-07-10 | Resolved: 2022-07-11

## 2022-07-11 PROBLEM — E87.20 LACTIC ACIDOSIS: Status: RESOLVED | Noted: 2022-07-10 | Resolved: 2022-07-11

## 2022-07-11 LAB
ALBUMIN SERPL-MCNC: 3.1 G/DL (ref 3.5–5.2)
ALBUMIN/GLOB SERPL: 1 G/DL
ALP SERPL-CCNC: 76 U/L (ref 39–117)
ALT SERPL W P-5'-P-CCNC: 8 U/L (ref 1–33)
ANION GAP SERPL CALCULATED.3IONS-SCNC: 13 MMOL/L (ref 5–15)
AST SERPL-CCNC: 13 U/L (ref 1–32)
BACTERIA SPEC AEROBE CULT: NORMAL
BASOPHILS # BLD AUTO: 0.05 10*3/MM3 (ref 0–0.2)
BASOPHILS NFR BLD AUTO: 0.5 % (ref 0–1.5)
BILIRUB SERPL-MCNC: 0.3 MG/DL (ref 0–1.2)
BUN SERPL-MCNC: 7 MG/DL (ref 8–23)
BUN/CREAT SERPL: 9.3 (ref 7–25)
CALCIUM SPEC-SCNC: 8.8 MG/DL (ref 8.6–10.5)
CHLORIDE SERPL-SCNC: 109 MMOL/L (ref 98–107)
CO2 SERPL-SCNC: 19 MMOL/L (ref 22–29)
CREAT SERPL-MCNC: 0.75 MG/DL (ref 0.57–1)
DEPRECATED RDW RBC AUTO: 75 FL (ref 37–54)
EGFRCR SERPLBLD CKD-EPI 2021: 86.8 ML/MIN/1.73
EOSINOPHIL # BLD AUTO: 0.01 10*3/MM3 (ref 0–0.4)
EOSINOPHIL NFR BLD AUTO: 0.1 % (ref 0.3–6.2)
ERYTHROCYTE [DISTWIDTH] IN BLOOD BY AUTOMATED COUNT: 25.8 % (ref 12.3–15.4)
GLOBULIN UR ELPH-MCNC: 3.1 GM/DL
GLUCOSE SERPL-MCNC: 87 MG/DL (ref 65–99)
HCT VFR BLD AUTO: 42.6 % (ref 34–46.6)
HGB BLD-MCNC: 12.3 G/DL (ref 12–15.9)
IMM GRANULOCYTES # BLD AUTO: 0.04 10*3/MM3 (ref 0–0.05)
IMM GRANULOCYTES NFR BLD AUTO: 0.4 % (ref 0–0.5)
LYMPHOCYTES # BLD AUTO: 1.22 10*3/MM3 (ref 0.7–3.1)
LYMPHOCYTES NFR BLD AUTO: 12.7 % (ref 19.6–45.3)
MCH RBC QN AUTO: 23.6 PG (ref 26.6–33)
MCHC RBC AUTO-ENTMCNC: 28.9 G/DL (ref 31.5–35.7)
MCV RBC AUTO: 81.6 FL (ref 79–97)
MONOCYTES # BLD AUTO: 0.61 10*3/MM3 (ref 0.1–0.9)
MONOCYTES NFR BLD AUTO: 6.4 % (ref 5–12)
NEUTROPHILS NFR BLD AUTO: 7.65 10*3/MM3 (ref 1.7–7)
NEUTROPHILS NFR BLD AUTO: 79.9 % (ref 42.7–76)
NRBC BLD AUTO-RTO: 0 /100 WBC (ref 0–0.2)
PLATELET # BLD AUTO: 421 10*3/MM3 (ref 140–450)
PMV BLD AUTO: 10.4 FL (ref 6–12)
POTASSIUM SERPL-SCNC: 4.4 MMOL/L (ref 3.5–5.2)
PROT SERPL-MCNC: 6.2 G/DL (ref 6–8.5)
RBC # BLD AUTO: 5.22 10*6/MM3 (ref 3.77–5.28)
SODIUM SERPL-SCNC: 141 MMOL/L (ref 136–145)
WBC NRBC COR # BLD: 9.58 10*3/MM3 (ref 3.4–10.8)

## 2022-07-11 PROCEDURE — 85025 COMPLETE CBC W/AUTO DIFF WBC: CPT | Performed by: FAMILY MEDICINE

## 2022-07-11 PROCEDURE — 80053 COMPREHEN METABOLIC PANEL: CPT | Performed by: FAMILY MEDICINE

## 2022-07-11 PROCEDURE — G0378 HOSPITAL OBSERVATION PER HR: HCPCS

## 2022-07-11 PROCEDURE — 25010000002 CEFTRIAXONE PER 250 MG: Performed by: FAMILY MEDICINE

## 2022-07-11 PROCEDURE — 99239 HOSP IP/OBS DSCHRG MGMT >30: CPT | Performed by: INTERNAL MEDICINE

## 2022-07-11 RX ORDER — HYDROCODONE BITARTRATE AND ACETAMINOPHEN 5; 325 MG/1; MG/1
1 TABLET ORAL EVERY 4 HOURS PRN
Qty: 10 TABLET | Refills: 0 | Status: SHIPPED | OUTPATIENT
Start: 2022-07-11 | End: 2022-07-17

## 2022-07-11 RX ORDER — CEFUROXIME AXETIL 500 MG/1
500 TABLET ORAL 2 TIMES DAILY
Qty: 12 TABLET | Refills: 0 | Status: SHIPPED | OUTPATIENT
Start: 2022-07-11 | End: 2022-07-17

## 2022-07-11 RX ADMIN — SODIUM CHLORIDE 1 G: 900 INJECTION INTRAVENOUS at 14:17

## 2022-07-11 RX ADMIN — Medication 10 ML: at 08:40

## 2022-07-11 RX ADMIN — HYDROCODONE BITARTRATE AND ACETAMINOPHEN 1 TABLET: 5; 325 TABLET ORAL at 11:30

## 2022-07-11 RX ADMIN — AMIODARONE HYDROCHLORIDE 200 MG: 200 TABLET ORAL at 08:39

## 2022-07-11 RX ADMIN — METOPROLOL SUCCINATE 25 MG: 25 TABLET, EXTENDED RELEASE ORAL at 08:38

## 2022-07-11 RX ADMIN — PANTOPRAZOLE SODIUM 40 MG: 40 TABLET, DELAYED RELEASE ORAL at 08:39

## 2022-07-11 NOTE — CASE MANAGEMENT/SOCIAL WORK
Discharge Planning Assessment  [facility]     Patient Name: Zulema Jarquin  MRN: [id number]  Today's Date: 7/11/2022    Admit Date: 7/10/2022     Discharge Needs Assessment     Row Name 07/11/22 1026       Living Environment    People in Home spouse    Current Living Arrangements home    Living Arrangement Comments [place] in [place] w/spouse in ranch w/basement.       Discharge Needs Assessment    Equipment Currently Used at Home none               Discharge Plan     Row Name 07/11/22 1027       Plan    Plan Home    Patient/Family in Agreement with Plan yes    Plan Comments spoke w/pt and family in room. Insurance confirmed. Not current w/HH or uses DME. [facility] [employer]. Denies any d/c needs @ this time. Family will transport home.    Final Discharge Disposition Code 01 - home or self-care    Row Name 07/11/22 0812       Plan    Final Discharge Disposition Code 01 - home or self-care              Continued Care and Services - Admitted Since 7/10/2022    Coordination has not been started for this encounter.       Expected Discharge Date and Time     Expected Discharge Date Expected Discharge Time    Jul 13, 2022          Demographic Summary     Row Name 07/11/22 1025       General Information    Arrived From emergency department    Preferred Language English       Contact Information    Contact Information Comments No POA or living will paperwork verbalized. PCP is Dr Micha Hernandez               Functional Status     Row Name 07/11/22 1026       Functional Status    Usual Activity Tolerance good       Functional Status, IADL    Medications independent    Meal Preparation independent    Housekeeping independent    Laundry independent    Shopping independent               Psychosocial    No documentation.                Abuse/Neglect    No documentation.                Legal    No documentation.                Substance Abuse    No documentation.                 Patient Forms    No documentation.                   Roshan Shaikh RN

## 2022-07-11 NOTE — CASE MANAGEMENT/SOCIAL WORK
Case Management Discharge Note      Final Note: Spoke w/pt in room earlier, plan is home. Family will transport. No d/c needs verbalized @ this time.         Selected Continued Care - Admitted Since 7/10/2022     Destination    No services have been selected for the patient.              Durable Medical Equipment    No services have been selected for the patient.              Dialysis/Infusion    No services have been selected for the patient.              Home Medical Care    No services have been selected for the patient.              Therapy    No services have been selected for the patient.              Community Resources    No services have been selected for the patient.              Community & DME    No services have been selected for the patient.                       Final Discharge Disposition Code: 01 - home or self-care

## 2022-07-11 NOTE — DISCHARGE SUMMARY
[facility] Medicine Services  DISCHARGE SUMMARY    Patient Name: Zulema Jarquin  : 1955  MRN: [id number]    Date of Admission: 7/10/2022  9:46 AM  Date of Discharge:  22  Primary Care Physician: Micha Hernandez MD    Consults     Date and Time Order Name Status Description    2022 12:34 AM Inpatient General Surgery Consult Completed     7/10/2022  5:52 PM Inpatient Urology Consult      2022  7:36 AM Inpatient Cardiology Consult Completed           Hospital Course     Presenting Problem:   Pyelonephritis [N12]  Splenic infarct [D73.5]    Active Hospital Problems    Diagnosis  POA   • **Splenic infarct [D73.5]  Yes   • Nephrolithiasis [N20.0]  Yes   • Pyelonephritis [N12]  Yes   • Right ureteral stone [N20.1]  Yes   • Presence of externally removable percutaneous endoscopic gastrostomy (PEG) tube (HCC) [Z93.1]  Not Applicable   • Atrial flutter (HCC) [I48.92]  Yes   • Iron deficiency anemia [D50.9]  Yes   • Hernia, hiatal [K44.9]  Yes      Resolved Hospital Problems    Diagnosis Date Resolved POA   • N&V (nausea and vomiting) [R11.2] 2022 Yes   • NATALI (acute kidney injury) (HCC) [N17.9] 2022 Yes   • Lactic acidosis [E87.2] 2022 Yes          Hospital Course:  Zulema Jarquin is a 66 y.o. female with PMH of Fe deficiency anemia, Aflutter, hiatal hernia s/p recent paraesophageal repair with PEG tube placement by Dr. Tristan presented with age-indeterminate splenic infarct along with right ureteral stone, bilateral nephrolithiasis with concern for acute right pyelonephritis.       Age-indeterminate splenic infarct  - Held anticoagulation on admission, normal finding post op per Dr. Tristan  -Continue Tylenol and Norco as needed for pain     Nephrolithiasis  Right ureteral stone  Right pyelonephritis  -Patient taken to the OR on 7/10/2022 by urology-Dr. Vasquez  -- follow up outpatient with Dr. Vasquez in one week to determine plan for definitive stone  treatment  -Received perioperative Rocephin -continue with PO Cefuroxime upon d/c pending urine culture, blood cultures x 2 PENDING  -- leukocytosis improving/resolved     Lactic acidosis  Nausea and vomiting  -Lactic acidosis has improved after IV fluids  --N/V resolved     Acute kidney injury  -Likely secondary to nausea vomiting and poor p.o. intake (ie prerenal in etiology)  -- resolved with IVFs     History of atrial flutter  -Continue home meds, not on anticoagulation due to h/o GIB?    History of iron deficiency anemia  -H&H wnl on admission     Hiatal hernia status post paraesophageal repair  Presence of PEG tube  -Courtesy consulted Dr. Tristan, follow up with him as previously arranged      Discharge Follow Up Recommendations for outpatient labs/diagnostics:   Follow up with PCP within one week  Follow up with Dr. Addi Vasquez in one week  Follow up with Dr. Tristan as previously arranged    Day of Discharge     HPI:   Doing well this am, N/V have resolved. Wants to go home today, states that Urology and Dr. Tristan have said she can go. Daughter and spouse at bedside.     Review of Systems  Gen- No fevers, chills  CV- No chest pain, palpitations  Resp- No cough, dyspnea  GI- No N/V/D, abd pain        Vital Signs:   Temp:  [97.4 °F (36.3 °C)-98.8 °F (37.1 °C)] 98.6 °F (37 °C)  Heart Rate:  [53-86] 61  Resp:  [14-18] 16  BP: (121-161)/(63-89) 159/89  Flow (L/min):  [2-4] 2      Physical Exam:  Constitutional: No acute distress, awake, alert  HENT: NCAT, mucous membranes moist  Respiratory: Clear to auscultation bilaterally, respiratory effort normal   Cardiovascular: RRR, no murmurs, rubs, or gallops  Gastrointestinal: Positive bowel sounds, soft, nontender, nondistended, PEG in place  Musculoskeletal: No bilateral ankle edema  Psychiatric: Appropriate affect, cooperative  Neurologic: Oriented x 3, strength symmetric in all extremities, Cranial Nerves grossly intact to confrontation, speech clear  Skin: No  rashes    Pertinent  and/or Most Recent Results     LAB RESULTS:      Lab 07/11/22  0617 07/10/22  1201 07/10/22  0842   WBC 9.58  --  14.96*   HEMOGLOBIN 12.3  --  13.9   HEMATOCRIT 42.6  --  46.4   PLATELETS 421  --  499*   NEUTROS ABS 7.65*  --  13.39*   IMMATURE GRANS (ABS) 0.04  --  0.09*   LYMPHS ABS 1.22  --  0.74   MONOS ABS 0.61  --  0.68   EOS ABS 0.01  --  0.01   MCV 81.6  --  76.3*   LACTATE  --  1.1 2.6*         Lab 07/11/22  0617 07/10/22  0842   SODIUM 141 139   POTASSIUM 4.4 4.4   CHLORIDE 109* 103   CO2 19.0* 19.0*   ANION GAP 13.0 17.0*   BUN 7* 13   CREATININE 0.75 1.27*   EGFR 86.8 46.2*   GLUCOSE 87 193*   CALCIUM 8.8 10.2         Lab 07/11/22  0617 07/10/22  0842   TOTAL PROTEIN 6.2 7.6   ALBUMIN 3.10* 3.90   GLOBULIN 3.1 3.7   ALT (SGPT) 8 11   AST (SGOT) 13 15   BILIRUBIN 0.3 0.4   ALK PHOS 76 97   LIPASE  --  15         Lab 07/10/22  0842   TROPONIN T <0.010                 Brief Urine Lab Results  (Last result in the past 365 days)      Color   Clarity   Blood   Leuk Est   Nitrite   Protein   CREAT   Urine HCG        07/10/22 1250 Yellow   Cloudy   Large (3+)   Negative   Negative   30 mg/dL (1+)               Microbiology Results (last 10 days)     ** No results found for the last 240 hours. **          Adult Transthoracic Echo Complete W/ Cont if Necessary Per Protocol    Result Date: 6/30/2022  · Left ventricular ejection fraction appears to be 56 - 60%. Left ventricular systolic function is normal. · Left ventricular wall thickness is consistent with borderline concentric hypertrophy. · Left atrial volume is mildly increased. · The right atrial cavity is borderline dilated. · Mild tricuspid valve regurgitation is present.      CT Abdomen Pelvis Without Contrast    Result Date: 7/10/2022  CT ABDOMEN PELVIS WO CONTRAST-  Date of Exam: 7/10/2022 9:10 AM  Indication: abd pain recent surgery for hernia repair and peg placement.  Comparison: Upper GI 6/29/2022  Technique: Contiguous axial CT  images were obtained from the lung bases to the to the pubic symphysis without contrast.  Sagittal and coronal reconstructions were performed.  Automated exposure control and iterative reconstruction methods were used.    FINDINGS:  Lower Chest: Small bilateral pleural effusions and minimal dependent atelectasis noted at the lung bases.  Postsurgical changes at the GE junction noted from Nissen fundoplication repair of paraesophageal hernia. Fluid is noted at the GE junction as well as increased density material compatible with postoperative mesh. No evidence of extraluminal air noted.  Organs: Geographic area of low attenuation involving the superior medial aspect of the spleen compatible with underlying splenic infarct. Remainder the spleen is grossly unremarkable in appearance.  Limited noncontrast imaging of the liver, pancreas and adrenal glands are grossly unremarkable in appearance.  Increased density within the gallbladder is noted which may represent stones or sludge  Punctate nonobstructing calculi within the kidneys bilaterally measuring up to 3 mm on the left and 2 mm on the right.  There is mild obstructive uropathy on the right secondary to a 5 mm calculus just below the right UPJ. No calculi along the ureters otherwise noted.  GI/Bowel: Postsurgical changes at the GE junction noted. Limited noncontrast imaging of the stomach demonstrates a percutaneous gastrostomy tube in expected position. No abnormal fluid collection along the percutaneous tract. Small bowel demonstrates no evidence of acute abnormality. No suspicious mesenteric adenopathy or fluid collection noted.  There is diverticulosis without evidence of acute diverticulitis. Surgical clips are noted within the left upper quadrant.  Ileocecal valve and appendix appear unremarkable  Pelvis: Bladder is incompletely distended and grossly unremarkable  Limited noncontrast imaging of the uterus and ovaries are unremarkable   Peritoneum/Retroperitoneum: The aorta is normal in caliber. No suspicious retroperitoneal adenopathy  Bones/Soft Tissues: No destructive bone lesion noted. Multilevel degenerative changes of the spine.       1. Postsurgical changes from recent paraesophageal hernia repair. No evidence of extraperitoneal air. 2. Moderate sized area of low-attenuation within the superior medial aspect of the spleen suggesting splenic infarct. This is age indeterminate but may be source of patient's pain. 3. There is mild obstructive uropathy on the right secondary to a 5 mm calculus just below the right UPJ. 4. Bilateral nonobstructing renal calculi 5. Diverticulosis without evidence of diverticulitis 6. Gastrostomy tube projects in expected position. 7. Bilateral small pleural effusions. 8. Other incidental findings as above   This report was finalized on 7/10/2022 10:21 AM by Ochoa Oden.      FL C Arm During Surgery    Result Date: 7/10/2022  DATE OF EXAM: 7/10/2022 4:12 PM  PROCEDURE: FL C ARM DURING SURGERY-  INDICATIONS: cysto; D73.5-Infarction of spleen; N20.1-Calculus of ureter; N20.0-Calculus of kidney; R10.9-Unspecified abdominal pain; E87.2-Acidosis; D72.829-Elevated white blood cell count, unspecified  COMPARISON: No comparisons available.  TECHNIQUE: One fluoroscopic spot image obtained over 20 seconds of fluoroscopy time  FINDINGS: Fluoroscopic imaging demonstrates a right ureteral stent in place. No calculus noted along the expected course of the ureter. Coarse project in expected position of proximal right renal collecting system and bladder.      Fluoroscopic imaging obtained without a radiologist present. Please see performed position for 40  This report was finalized on 7/10/2022 6:03 PM by Ochoa Oden.                Results for orders placed during the hospital encounter of 06/28/22    Adult Transthoracic Echo Complete W/ Cont if Necessary Per Protocol    Interpretation Summary  · Left ventricular  ejection fraction appears to be 56 - 60%. Left ventricular systolic function is normal.  · Left ventricular wall thickness is consistent with borderline concentric hypertrophy.  · Left atrial volume is mildly increased.  · The right atrial cavity is borderline dilated.  · Mild tricuspid valve regurgitation is present.      Plan for Follow-up of Pending Labs/Results:   Pending Labs     Order Current Status    Blood Culture - Blood, Arm, Right In process    Blood Culture - Blood, Hand, Left In process    Urine Culture - Urine, Urine, Clean Catch In process        Discharge Details        Discharge Medications      New Medications      Instructions Start Date   cefuroxime 500 MG tablet  Commonly known as: CEFTIN   500 mg, Oral, 2 Times Daily      HYDROcodone-acetaminophen 5-325 MG per tablet  Commonly known as: NORCO   1 tablet, Oral, Every 4 Hours PRN         Continue These Medications      Instructions Start Date   amiodarone 200 MG tablet  Commonly known as: PACERONE   200 mg, Oral, Daily      Docu 150 MG/15ML syrup  Generic drug: Docusate Sodium   100 mg, Oral, Daily      metoprolol succinate XL 25 MG 24 hr tablet  Commonly known as: TOPROL-XL   25 mg, Oral, Daily      omeprazole 40 MG capsule  Commonly known as: priLOSEC   20 mg, Oral, Daily      vitamin D3 125 MCG (5000 UT) tablet tablet   5,000 Units, Oral, Daily         Stop These Medications    ondansetron 4 MG tablet  Commonly known as: ZOFRAN            Allergies   Allergen Reactions   • Molds & Smuts Shortness Of Breath and Cough   • Amoxicillin-Pot Clavulanate GI Intolerance, Diarrhea and Nausea Only         Discharge Disposition:  Home or Self Care    Diet:  Hospital:  Diet Order   Procedures   • Diet Post Fundoplication; Stage 2 Full Liquid for Post Fundoplication       Activity:      Restrictions or Other Recommendations:         CODE STATUS:    Code Status and Medical Interventions:   Ordered at: 07/10/22 3934     Level Of Support Discussed With:     Patient     Code Status (Patient has no pulse and is not breathing):    CPR (Attempt to Resuscitate)     Medical Interventions (Patient has pulse or is breathing):    Full Support       Future Appointments   Date Time Provider Department Center   8/17/2022  2:30 PM Davon Rudolph III, MD [facility]       Additional Instructions for the Follow-ups that You Need to Schedule     Discharge Follow-up with PCP   As directed       Currently Documented PCP:    Micha Hernandez MD    PCP Phone Number:    [phone]     Follow Up Details: in one week with PCP         Discharge Follow-up with Specified Provider: Urology/Dr. Addi Vasquez; 1 Week   As directed      To: Urology/Dr. Addi Vasquez    Follow Up: 1 Week                     Neelam Mack MD  07/11/22      Time Spent on Discharge:  I spent  35  minutes on this discharge activity which included: face-to-face encounter with the patient, reviewing the data in the system, coordination of the care with the nursing staff as well as consultants, documentation, and entering orders.

## 2022-07-11 NOTE — CONSULTS
Patient Name:  Zulema Jarquin  YOB: 1955  [id number]       Patient Care Team:  Micha Hernandez MD as PCP - General  AbranMina coley MD as Consulting Physician (Gastroenterology)      General Surgery Consult Note     Date of Consultation: 07/11/22    Consulting Physician : Neelam Mack MD    Reason for Consult : Recent operation, splenic infarct    Subjective     I have been asked to see  Zulema Jarquin , a 66 y.o. female in consultation for her recent operation and splenic infarct.  She is well-known to me.  She underwent a Laparoscopic paraesophageal hernia repair with mesh, toupee fundoplication, EGD with PEG placement on 6/28/2022 for a large paraesophageal hernia.  Postoperatively she had A. fib with RVR which converted medically.  She was subsequent discharged home on 7/1/2022.  She had been feeling well at home and progressing well, but had worsening abdominal pain represented to the emergency department.  Subsequent evaluation revealed evidence of a right kidney stone.  She underwent cystoscopy with stent placement yesterday.  Also noted on her CT scan was a small area of splenic infarct of the superior pole of the spleen.  We have been asked to see her for possible surgical management.  Oertli she is feeling better, tolerating diet.  Her bowels are working.  Her incisions are healing well by her report.      Allergy:   Allergies   Allergen Reactions   • Molds & Smuts Shortness Of Breath and Cough   • Amoxicillin-Pot Clavulanate GI Intolerance, Diarrhea and Nausea Only       Medications:  amiodarone, 200 mg, Oral, Daily  cefTRIAXone, 1 g, Intravenous, Q24H  metoprolol succinate XL, 25 mg, Oral, Daily  pantoprazole, 40 mg, Oral, QAM  sodium chloride, 10 mL, Intravenous, Q12H      lactated ringers, 9 mL/hr, Last Rate: 9 mL/hr (07/10/22 1608)  sodium chloride, 100 mL/hr, Last Rate: 100 mL/hr (07/10/22 9078)      No current facility-administered medications on file prior to  encounter.     Current Outpatient Medications on File Prior to Encounter   Medication Sig   • amiodarone (PACERONE) 200 MG tablet Take 1 tablet by mouth Daily.   • Cholecalciferol (vitamin D3) 125 MCG (5000 UT) tablet tablet Take 5,000 Units by mouth Daily.   • docusate (COLACE) 50 MG/5ML liquid Take 10 mL by mouth Daily.   • metoprolol succinate XL (TOPROL-XL) 25 MG 24 hr tablet Take 1 tablet by mouth Daily.   • omeprazole (priLOSEC) 40 MG capsule Take 20 mg by mouth Daily.       PMHx:   Past Medical History:   Diagnosis Date   • Anemia    • Arthritis    • GERD (gastroesophageal reflux disease)    • Headache    • Heart murmur     not always detected, history of Mitral Valve prolapse ~1980 was prescribed meds saw a cardiologist and since has been d/c'd from care and meds discontinued per cardiology several years ago   • History of transfusion 2020    due to anemia, no reaction   • Hypertension     not currently prescribed meds   • Kidney stones     H/O   • PONV (postoperative nausea and vomiting)    • Rheumatic fever    • Ulcer, stomach peptic    • Wears glasses        Past Surgical History:  Past Surgical History:   Procedure Laterality Date   • CARPAL TUNNEL RELEASE Right 1999   • COLONOSCOPY  07/2019   • ENDOSCOPY N/A 7/5/2019    Procedure: ESOPHAGOGASTRODUODENOSCOPY;  Surgeon: Brunner, Mark I, MD;  Location:  [facility];  Service: Gastroenterology   • ESOPHAGEAL MOTILITY STUDY N/A 1/11/2022    Procedure: ESOPHAGEAL MOTILITY STUDY;  Surgeon: Kamari Tristan MD;  Location:  [facility];  Service: General;  Laterality: N/A;  Patient tolerated esophageal manometry well. Probe is advanced to 38cm with LES showing at 31.5cm.   • PARAESOPHAGEAL HERNIA REPAIR N/A 6/28/2022    Procedure: PARAESOPHAGEAL HERNIA REPAIR LAPAROSCOPIC, WITH MESH, NISSEN , PEG/EGD;  Surgeon: Kamari Tristan MD;  Location:  [facility];  Service: General;  Laterality: N/A;   • TUBAL ABDOMINAL LIGATION  1983         Family History:  "Noncontributory     Social History:     Tobacco use: Denies     EtOH use : Denies    Illicit drug use: Denies      Review of Systems        Constitutional: No fevers, chills or malaise   Eyes: Denies visual changes    Cardiovascular: Denies chest pain, palpitations   Pulmonary: Denies cough or shortness of breath   Abdominal/ GI: See HPI    Genitourinary: Denies dysuria or hematuria   Musculoskeletal: Denies any but chronic joint aches, pains or deformities   Psychiatric: No recent mood changes   Neurologic: No paresthesias or loss of function          Objective     Physical Exam:      Vital Signs  /63 (BP Location: Right arm, Patient Position: Lying)   Pulse 61   Temp 98.3 °F (36.8 °C) (Oral)   Resp 14   Ht 154.9 cm (61\")   Wt 72.6 kg (160 lb)   SpO2 91%   BMI 30.23 kg/m²     Intake/Output Summary (Last 24 hours) at 7/11/2022 0713  Last data filed at 7/11/2022 0030  Gross per 24 hour   Intake 3000 ml   Output 1050 ml   Net 1950 ml         Physical Exam:    Head: Normocephalic, atraumatic.   Eyes: Pupils equal, round, react to light and accommodation.   Mouth: Oral mucosa without lesions,   Neck: No masses, lymphadenopathy or carotid bruits bilaterally   CV: Rhythm  and rate regular , no  murmurs, rubs or gallops  Lungs: Clear  to auscultation bilaterally   Abdomen: Bowel sounds positive  , soft, nontender. Incisions and PEG c/d/i  Groin : No obvious hernias bilaterally   Extremities:  No cyanosis, clubbing or edema bilaterally   Lymphatics: No abnormal lymphadenopathy appreciated   Neurologic: No gross deficits       Results Review: I have personally reviewed all of the recent lab and imaging results available at this time.  Laboratory exam on arrival reveals a white count of 14,000 with a hemoglobin of 13.9 and a platelet count of 499.  Comprehensive metabolic panel essentially normal.  Lactate mildly elevated 2.6.  CT scan of the abdomen pelvis was personally viewed by me as well as the final " dictated and edited report.  This demonstrates a small area of splenic infarcts.  Portal of the spleen.  There is no evidence of extraluminal air.  There is obstructive uropathy on the right due to a 5 mm calculus at the UPJ.  There is no free fluid.           Assessment and Plan:    Problem List Items Addressed This Visit        Genitourinary and Reproductive     Lactic acidosis       Hematology and Neoplasia    * (Principal) Splenic infarct - Primary- This can be an expected finding after paraesophageal hernia pair.  Especially with the necessary takedown of the short gastric vessels.  There is no need for any intervention on this and this will resolve spontaneously.  I suspect most of her pain was due to her kidney stone which has been resolved with stent placement.  From my standpoint she may be discharged home when appropriate from the urology perspective.  She has already established follow-up with me in a few weeks.      Other Visit Diagnoses     Ureteral stone        Kidney stone        Acute abdominal pain        Leukocytosis, unspecified type               Active Hospital Problems    Diagnosis  POA   • **Splenic infarct [D73.5]  Yes   • Nephrolithiasis [N20.0]  Yes   • Pyelonephritis [N12]  Yes   • Right ureteral stone [N20.1]  Yes   • Presence of externally removable percutaneous endoscopic gastrostomy (PEG) tube ([facility]) [Z93.1]  Not Applicable   • N&V (nausea and vomiting) [R11.2]  Yes   • NATALI (acute kidney injury) ([facility]) [N17.9]  Yes   • Lactic acidosis [E87.2]  Yes   • Atrial flutter ([facility]) [I48.92]  Yes   • Iron deficiency anemia [D50.9]  Yes   • Hernia, hiatal [K44.9]  Yes      Resolved Hospital Problems   No resolved problems to display.            I discussed the patient's findings and my recommendations with the patient and/or family, as well as the primary team     Kamari Tristan MD  07/11/22  07:13 EDT

## 2022-07-12 ENCOUNTER — READMISSION MANAGEMENT (OUTPATIENT)
Dept: CALL CENTER | Facility: HOSPITAL | Age: 67
End: 2022-07-12

## 2022-07-12 NOTE — OUTREACH NOTE
Medical Week 1 Survey    Flowsheet Row Responses   [facility] patient discharged from? [place]   Does the patient have one of the following disease processes/diagnoses(primary or secondary)? Other   Week 1 attempt successful? No   Unsuccessful attempts Attempt 1          NEENA MAJOR - Registered Nurse   ATP and 5 Shocks

## 2022-07-12 NOTE — OUTREACH NOTE
Prep Survey    Flowsheet Row Responses   [facility] patient discharged from? [place]   Is LACE score < 7 ? No   Emergency Room discharge w/ pulse ox? No   Eligibility Readm Mgmt   Discharge diagnosis Splenic infarct  Pyelonephritis Right ureteral stone Presence of externally removable percutaneous endoscopic gastrostomy    Does the patient have one of the following disease processes/diagnoses(primary or secondary)? Other   Does the patient have Home health ordered? No   Is there a DME ordered? No   Prep survey completed? Yes          FRANCISCO JAY - Registered Nurse

## 2022-07-15 ENCOUNTER — READMISSION MANAGEMENT (OUTPATIENT)
Dept: CALL CENTER | Facility: HOSPITAL | Age: 67
End: 2022-07-15

## 2022-07-15 LAB
BACTERIA SPEC AEROBE CULT: NORMAL
BACTERIA SPEC AEROBE CULT: NORMAL

## 2022-07-15 NOTE — OUTREACH NOTE
Medical Week 1 Survey    Flowsheet Row Responses   [facility] patient discharged from? [place]   Does the patient have one of the following disease processes/diagnoses(primary or secondary)? Other   Week 1 attempt successful? Yes   Call start time 1551   Call end time 1554   Discharge diagnosis Splenic infarct  Pyelonephritis Right ureteral stone Presence of externally removable percutaneous endoscopic gastrostomy    Meds reviewed with patient/caregiver? Yes   Is the patient having any side effects they believe may be caused by any medication additions or changes? No   Does the patient have all medications ordered at discharge? Yes   Is the patient taking all medications as directed (includes completed medication regime)? Yes   Does the patient have a primary care provider?  Yes   Does the patient have an appointment with their PCP within 7 days of discharge? Yes   Has the patient kept scheduled appointments due by today? N/A   Has home health visited the patient within 72 hours of discharge? N/A   Psychosocial issues? No   Did the patient receive a copy of their discharge instructions? Yes   Nursing interventions Reviewed instructions with patient   What is the patient's perception of their health status since discharge? Same   Is the patient/caregiver able to teach back signs and symptoms related to disease process for when to call PCP? Yes   Is the patient/caregiver able to teach back signs and symptoms related to disease process for when to call 911? Yes   Is the patient/caregiver able to teach back the hierarchy of who to call/visit for symptoms/problems? PCP, Specialist, Home health nurse, Urgent Care, ED, 911 Yes   If the patient is a current smoker, are they able to teach back resources for cessation? Not a smoker   Additional teach back comments Enc pt to drink plenty fluids   Week 1 call completed? Yes          NEENA MAJOR - Registered Nurse

## 2022-07-17 LAB
QT INTERVAL: 444 MS
QTC INTERVAL: 531 MS

## 2022-07-27 ENCOUNTER — READMISSION MANAGEMENT (OUTPATIENT)
Dept: CALL CENTER | Facility: HOSPITAL | Age: 67
End: 2022-07-27

## 2022-07-27 NOTE — OUTREACH NOTE
Medical Week 3 Survey    Flowsheet Row Responses   [facility] patient discharged from? [place]   Does the patient have one of the following disease processes/diagnoses(primary or secondary)? Other   Week 3 attempt successful? Yes   Call start time 1143   Call end time 1147   Discharge diagnosis Splenic infarct  Pyelonephritis Right ureteral stone Presence of externally removable percutaneous endoscopic gastrostomy    Person spoke with today (if not patient) and relationship patient   Meds reviewed with patient/caregiver? Yes   Does the patient have all medications ordered at discharge? Yes   Is the patient taking all medications as directed (includes completed medication regime)? Yes   Comments regarding appointments Sees Dr Tristan on Monday 8/1 and urology at end of August   Does the patient have a primary care provider?  Yes   Comments regarding PCP Patient has had f/u with PCP since discharge.    Has the patient kept scheduled appointments due by today? Yes   Has home health visited the patient within 72 hours of discharge? N/A   Psychosocial issues? No   Did the patient receive a copy of their discharge instructions? Yes   Nursing interventions Reviewed instructions with patient   What is the patient's perception of their health status since discharge? Improving   Is the patient/caregiver able to teach back signs and symptoms related to disease process for when to call PCP? Yes   Is the patient/caregiver able to teach back signs and symptoms related to disease process for when to call 911? Yes   Is the patient/caregiver able to teach back the hierarchy of who to call/visit for symptoms/problems? PCP, Specialist, Home health nurse, Urgent Care, ED, 911 Yes   If the patient is a current smoker, are they able to teach back resources for cessation? Not a smoker   Week 3 Call Completed? Yes          FRANCISCO BERNSTEIN - Registered Nurse

## 2022-08-05 ENCOUNTER — READMISSION MANAGEMENT (OUTPATIENT)
Dept: CALL CENTER | Facility: HOSPITAL | Age: 67
End: 2022-08-05

## 2022-08-05 NOTE — OUTREACH NOTE
Medical Week 4 Survey    Flowsheet Row Responses   [facility] patient discharged from? [place]   Does the patient have one of the following disease processes/diagnoses(primary or secondary)? Other   Week 4 attempt successful? No          JENI WYATT - Registered Nurse

## 2023-02-24 NOTE — PROGRESS NOTES
"Patient Name:  Zulema Jarquin  YOB: 1955  [id number]    Surgery Progress Note    Date of visit: 7/1/2022    Subjective   Subjective: Feeling much better, wants to go home.         Objective     Objective:     BP (!) 177/105 (BP Location: Left arm, Patient Position: Lying)   Pulse 80   Temp 98.4 °F (36.9 °C) (Oral)   Resp 18   Ht 154.9 cm (60.98\")   Wt 72.8 kg (160 lb 7.9 oz)   SpO2 91%   BMI 30.34 kg/m²     Intake/Output Summary (Last 24 hours) at 7/1/2022 0754  Last data filed at 6/30/2022 2000  Gross per 24 hour   Intake 60 ml   Output --   Net 60 ml       CV:  Rhythm  regular and rate regular   L:  Clear  to auscultation bilaterally   Abd:  Bowel sounds positive , soft, nontender. Dressings and PEG c/d/i  Ext:  No cyanosis, clubbing, edema    Recent labs that are back at this time have been reviewed. HGB 11.2           Assessment/ Plan:    Problem List Items Addressed This Visit        Gastrointestinal Abdominal     Paraesophageal hernia- Doing well after repair. Back in [facility]. Appreciate cardiology assistance. Plan to D/ C home today once OK with Cardiology, who plans for outpatient Holter monitor. RTC with me in 4 weeks.      Relevant Orders    Tissue Pathology Exam (Completed)           Active Hospital Problems    Diagnosis  POA   • **Hernia, hiatal [K44.9]  Yes   • Paraesophageal hernia [K44.9]  Yes   • Symptomatic anemia [D64.9]  Yes      Resolved Hospital Problems   No resolved problems to display.              Kamari Tristan MD  7/1/2022  07:54 EDT      " Admitted

## 2023-03-22 ENCOUNTER — TRANSCRIBE ORDERS (OUTPATIENT)
Dept: ADMINISTRATIVE | Facility: HOSPITAL | Age: 68
End: 2023-03-22
Payer: MEDICARE

## 2023-03-22 DIAGNOSIS — K44.9 DIAPHRAGMATIC HERNIA WITHOUT OBSTRUCTION OR GANGRENE: Primary | ICD-10-CM

## 2023-03-30 ENCOUNTER — HOSPITAL ENCOUNTER (OUTPATIENT)
Dept: GENERAL RADIOLOGY | Facility: HOSPITAL | Age: 68
Discharge: HOME | End: 2023-03-30
Admitting: SURGERY
Payer: MEDICARE

## 2023-03-30 DIAGNOSIS — K44.9 DIAPHRAGMATIC HERNIA WITHOUT OBSTRUCTION OR GANGRENE: ICD-10-CM

## 2023-03-30 PROCEDURE — 74220 X-RAY XM ESOPHAGUS 1CNTRST: CPT

## 2023-03-30 RX ADMIN — BARIUM SULFATE 183 ML: 960 POWDER, FOR SUSPENSION ORAL at 10:45

## 2023-05-12 ENCOUNTER — LAB REQUISITION (OUTPATIENT)
Dept: LAB | Facility: HOSPITAL | Age: 68
End: 2023-05-12
Payer: MEDICARE

## 2023-05-12 DIAGNOSIS — K44.9 DIAPHRAGMATIC HERNIA WITHOUT OBSTRUCTION OR GANGRENE: ICD-10-CM

## 2023-05-12 PROCEDURE — 88305 TISSUE EXAM BY PATHOLOGIST: CPT

## 2023-05-15 LAB — REF LAB TEST METHOD: NORMAL

## 2023-07-24 ENCOUNTER — OFFICE VISIT (OUTPATIENT)
Dept: CARDIOLOGY | Facility: CLINIC | Age: 68
End: 2023-07-24
Payer: MEDICARE

## 2023-07-24 VITALS
BODY MASS INDEX: 32.36 KG/M2 | HEART RATE: 66 BPM | WEIGHT: 171.4 LBS | HEIGHT: 61 IN | OXYGEN SATURATION: 93 % | DIASTOLIC BLOOD PRESSURE: 96 MMHG | SYSTOLIC BLOOD PRESSURE: 166 MMHG

## 2023-07-24 DIAGNOSIS — I48.0 PAROXYSMAL ATRIAL FIBRILLATION: Primary | ICD-10-CM

## 2023-07-24 PROCEDURE — 99213 OFFICE O/P EST LOW 20 MIN: CPT | Performed by: INTERNAL MEDICINE

## 2023-07-24 PROCEDURE — 93000 ELECTROCARDIOGRAM COMPLETE: CPT | Performed by: INTERNAL MEDICINE

## 2023-07-24 RX ORDER — SIMETHICONE 80 MG
80 TABLET,CHEWABLE ORAL EVERY 6 HOURS PRN
COMMUNITY

## 2023-07-24 RX ORDER — LOSARTAN POTASSIUM AND HYDROCHLOROTHIAZIDE 12.5; 5 MG/1; MG/1
1 TABLET ORAL DAILY
Qty: 30 TABLET | Refills: 6 | Status: SHIPPED | OUTPATIENT
Start: 2023-07-24

## 2023-07-24 NOTE — PROGRESS NOTES
"[facility] Cardiology  Office visit  Zulema Jarquin  1955  [phone]  There is no work phone number on file.    VISIT DATE:  7/24/2023    PCP: Naomi Shea, APRN  [address]  [facility] [address]    CC:  Chief Complaint   Patient presents with    Rapid Heart Rate    Hypertension       Previous cardiac studies and procedures:  June 2022 TTE  Left ventricular ejection fraction appears to be 56 - 60%. Left ventricular systolic function is normal.  Left ventricular wall thickness is consistent with borderline concentric hypertrophy.  Left atrial volume is mildly increased.  The right atrial cavity is borderline dilated.  Mild tricuspid valve regurgitation is present.    ASSESSMENT:   Diagnosis Plan   1. Paroxysmal atrial fibrillation  Basic Metabolic Panel          PLAN:  Paroxysmal atrial fibrillation occurring status post laparoscopic paraesophageal hernia repair with Nissen fundoplication: Isolated postop episode.  No evidence of recurrence.  Continue smart watch for monitoring.  Continue low-dose beta-blockade.    Hypertension: Goal is 130/80 mmHg.  Continue metoprolol succinate 25 mg p.o. daily.  Adding losartan hydrochlorothiazide 50-12.5 mg p.o. daily.  Repeat BMP in 1 week.  Discussed dietary and lifestyle modifications to achieve weight loss.    Subjective  Denies palpitations.  Has been wearing Apple Watch to trend for recurrent atrial fibrillation.  Blood pressures have been trending in the 150-165/85-95 mmHg age.  Denies chest pain or dyspnea.  Compliant with medical therapy.    PHYSICAL EXAMINATION:  Vitals:    07/24/23 1321   BP: 166/96   BP Location: Right arm   Patient Position: Sitting   Cuff Size: Adult   Pulse: 66   SpO2: 93%   Weight: 77.7 kg (171 lb 6.4 oz)   Height: 154.9 cm (61\")     General Appearance:    Alert, cooperative, no distress, appears stated age   Head:    Normocephalic, without obvious abnormality, atraumatic   Eyes:    " conjunctiva/corneas clear   Nose:   Nares normal, septum midline, mucosa normal, no drainage   Throat:   Lips, teeth and gums normal   Neck:   Supple, symmetrical, trachea midline, no carotid    bruit or JVD   Lungs:     Clear to auscultation bilaterally, respirations unlabored   Chest Wall:    No tenderness or deformity    Heart:    Regular rate and rhythm, S1 and S2 normal, no murmur, rub   or gallop, normal carotid impulse bilaterally without bruit.   Abdomen:     Soft, non-tender   Extremities:   Extremities normal, atraumatic, no cyanosis or edema   Pulses:   2+ and symmetric all extremities   Skin:   Skin color, texture, turgor normal, no rashes or lesions       Diagnostic Data:    ECG 12 Lead    Date/Time: 7/24/2023 1:35 PM  Performed by: Davon Rudolph III, MD  Authorized by: Davon Rudolph III, MD   Comparison: compared with previous ECG from 7/10/2022  Similar to previous ECG  Rhythm: sinus rhythm    Clinical impression: normal ECG      No results found for: CHLPL, TRIG, HDL, LDLDIRECT  Lab Results   Component Value Date    GLUCOSE 87 07/11/2022    BUN 7 (L) 07/11/2022    CREATININE 0.75 07/11/2022     07/11/2022    K 4.4 07/11/2022     (H) 07/11/2022    CO2 19.0 (L) 07/11/2022     Lab Results   Component Value Date    HGBA1C 5.1 06/28/2022     Lab Results   Component Value Date    WBC 9.58 07/11/2022    HGB 12.3 07/11/2022    HCT 42.6 07/11/2022     07/11/2022       Allergies  Allergies   Allergen Reactions    Molds & Smuts Shortness Of Breath and Cough    Amoxicillin-Pot Clavulanate GI Intolerance, Diarrhea and Nausea Only       Current Medications    Current Outpatient Medications:     Cholecalciferol (vitamin D3) 125 MCG (5000 UT) tablet tablet, Take 1 tablet by mouth Daily., Disp: , Rfl:     metoprolol succinate XL (TOPROL-XL) 25 MG 24 hr tablet, Take 1 tablet by mouth Daily. Need f/u appt for further refills., Disp: 30 tablet, Rfl: 0    simethicone (MYLICON) 80 MG chewable tablet,  Chew 1 tablet Every 6 (Six) Hours As Needed for Flatulence., Disp: , Rfl:     docusate (COLACE) 50 MG/5ML liquid, Take 10 mL by mouth Daily., Disp: 200 mL, Rfl: 0    losartan-hydrochlorothiazide (HYZAAR) 50-12.5 MG per tablet, Take 1 tablet by mouth Daily., Disp: 30 tablet, Rfl: 6    omeprazole (priLOSEC) 40 MG capsule, Take 20 mg by mouth Daily., Disp: , Rfl:           ROS  ROS      SOCIAL HX  Social History     Socioeconomic History    Marital status:    Tobacco Use    Smoking status: Never    Smokeless tobacco: Never   Vaping Use    Vaping Use: Never used   Substance and Sexual Activity    Alcohol use: Yes     Comment: rarely    Drug use: No    Sexual activity: Yes     Partners: Male     Birth control/protection: None, Tubal ligation       FAMILY HX  Family History   Problem Relation Age of Onset    Heart failure Mother          from this             Davon Rudolph III, MD, FACC

## 2023-07-31 ENCOUNTER — LAB (OUTPATIENT)
Dept: LAB | Facility: HOSPITAL | Age: 68
End: 2023-07-31
Payer: MEDICARE

## 2023-07-31 PROCEDURE — 80048 BASIC METABOLIC PNL TOTAL CA: CPT | Performed by: INTERNAL MEDICINE

## 2023-08-02 RX ORDER — METOPROLOL SUCCINATE 25 MG/1
TABLET, EXTENDED RELEASE ORAL
Qty: 30 TABLET | Refills: 0 | Status: SHIPPED | OUTPATIENT
Start: 2023-08-02

## 2023-08-03 ENCOUNTER — TELEPHONE (OUTPATIENT)
Dept: CARDIOLOGY | Facility: CLINIC | Age: 68
End: 2023-08-03
Payer: MEDICARE

## 2023-08-29 RX ORDER — METOPROLOL SUCCINATE 25 MG/1
25 TABLET, EXTENDED RELEASE ORAL DAILY
Qty: 30 TABLET | Refills: 1 | Status: SHIPPED | OUTPATIENT
Start: 2023-08-29

## 2023-10-26 RX ORDER — METOPROLOL SUCCINATE 25 MG/1
25 TABLET, EXTENDED RELEASE ORAL DAILY
Qty: 30 TABLET | Refills: 3 | Status: SHIPPED | OUTPATIENT
Start: 2023-10-26

## 2025-07-09 ENCOUNTER — OFFICE VISIT (OUTPATIENT)
Dept: CARDIOLOGY | Facility: CLINIC | Age: 70
End: 2025-07-09
Payer: MEDICARE

## 2025-07-09 VITALS
SYSTOLIC BLOOD PRESSURE: 148 MMHG | HEART RATE: 75 BPM | OXYGEN SATURATION: 99 % | WEIGHT: 169.9 LBS | DIASTOLIC BLOOD PRESSURE: 86 MMHG | BODY MASS INDEX: 32.08 KG/M2 | HEIGHT: 61 IN

## 2025-07-09 DIAGNOSIS — R00.0 TACHYCARDIA: ICD-10-CM

## 2025-07-09 DIAGNOSIS — I48.0 PAROXYSMAL ATRIAL FIBRILLATION: Primary | ICD-10-CM

## 2025-07-09 DIAGNOSIS — I10 PRIMARY HYPERTENSION: ICD-10-CM

## 2025-07-09 PROCEDURE — 99213 OFFICE O/P EST LOW 20 MIN: CPT

## 2025-07-09 RX ORDER — LOSARTAN POTASSIUM 50 MG/1
50 TABLET ORAL DAILY
Qty: 90 TABLET | Refills: 3 | Status: SHIPPED | OUTPATIENT
Start: 2025-07-09

## 2025-07-09 RX ORDER — METOPROLOL TARTRATE 100 MG/1
100 TABLET ORAL 2 TIMES DAILY
COMMUNITY

## 2025-07-09 RX ORDER — CYCLOBENZAPRINE HCL 5 MG
5 TABLET ORAL AS NEEDED
COMMUNITY

## 2025-07-09 RX ORDER — PAROXETINE 10 MG/1
TABLET, FILM COATED ORAL
COMMUNITY
Start: 2025-01-15

## 2025-07-09 NOTE — PROGRESS NOTES
"[facility] Cardiology  Office visit  Zulema Jarquin  1955  [phone]      VISIT DATE:  7/9/2025    PCP: Naomi Shea, URBAN  [address]  [facility] [id number]    CC:  Chief Complaint   Patient presents with    Paroxysmal atrial fibrillation   tachycardia    Previous cardiac studies and procedures:  June 2022 TTE  Left ventricular ejection fraction appears to be 56 - 60%. Left ventricular systolic function is normal.  Left ventricular wall thickness is consistent with borderline concentric hypertrophy.  Left atrial volume is mildly increased.  The right atrial cavity is borderline dilated.  Mild tricuspid valve regurgitation is present.    ASSESSMENT:   Diagnosis Plan   1. Paroxysmal atrial fibrillation  Holter Monitor - 24 Hour      2. Tachycardia        3. Primary hypertension            PLAN:  Paroxysmal atrial fibrillation occurring status post laparoscopic paraesophageal hernia repair with Nissen fundoplication: Isolated postop episode, no documented recurrence. Smart watch showing rates 100-120 resting on metoprolol 100 BID without \"afib alert\".  Labs with PCP ruled out anemia, thyroid dysfunction, and electrolyte abnormalities. Will place 24 hour holter to rule out atrial flutter. Continue metoprolol for now. Results will be called to her. Consider formal sleep evaluation if atrial arrhythmias persist.     Hypertension: SBP >140 in office today and at home. Goal is 130/80 mmHg. PCP stopped losartan-HCTZ to allow for titration of metoprolol. Continue metoprolol for now. Restart losartan 50 mg daily.  Continue to monitor home blood pressures.    Subjective  She is here for follow up due to tachycardia noted on smart watch. No alerts for afib. Resting HR trend reviewed on watch, -120.  She is mostly asymptomatic however will occasionally have facial flushing and diaphoresis but denies associated palpitations, chest pain, shortness of breath, dizziness, " "and presyncope/syncope.  EKG from PCP shows SR. PCP stopped losartan/HCTZ to allow for titration of metoprolol tartrate (100 mg twice daily).  Systolic blood pressures have been trending in the 140s off losartan/HCTZ.  Heart rates have remained elevated despite increase in metoprolol.  She denies excessive snoring but does feel fatigued through the day.  She has never had formal sleep evaluation.    PHYSICAL EXAMINATION:  Vitals:    25 0916   BP: 148/86   BP Location: Left arm   Patient Position: Sitting   Cuff Size: Adult   Pulse: 75   SpO2: 99%   Weight: 77.1 kg (169 lb 14.4 oz)   Height: 154.9 cm (61\")   Heart rate normal, regular rhythm at current, EKG not repeated    Vitals reviewed.   Constitutional:       Appearance: Healthy appearance.   Neck:      Vascular: No JVD.   Pulmonary:      Effort: Pulmonary effort is normal.      Breath sounds: Normal breath sounds.   Cardiovascular:      Normal rate. Regular rhythm. Normal S1. Normal S2.       Murmurs: There is no murmur.      No gallop.  No rub.   Pulses:     Intact distal pulses.   Edema:     Peripheral edema absent.   Skin:     General: Skin is warm and dry.   Neurological:      General: No focal deficit present.      Mental Status: Alert and oriented to person, place and time.         Diagnostic Data:  Procedures   2025  CBC: WBC 7.4, HGB 14.9, HCT 46.7, PLTS 295  CMP: GLU 85, BUN 14, CR 0.90, , K 4.2, CO2 27, ALB 4.6, AST 15, ALT 9  TSH: 2.3  Ma.1     EKG from PCP reviewed, 2025: Sinus rhythm, nonspecific ST abnormality    Allergies  Allergies   Allergen Reactions    Molds & Smuts Shortness Of Breath and Cough    Amoxicillin-Pot Clavulanate GI Intolerance, Diarrhea and Nausea Only     Current Medications  Current Outpatient Medications   Medication Instructions    cyclobenzaprine (FLEXERIL) 5 mg, As Needed    losartan (COZAAR) 50 mg, Oral, Daily    metoprolol tartrate (LOPRESSOR) 100 mg, 2 Times Daily    PARoxetine (PAXIL) 10 MG " tablet Take  by mouth.    simethicone (MYLICON) 80 mg, Every 6 Hours PRN    vitamin D3 5,000 Units, Daily      Social and family history reviewed and unchanged.    Bibi Back, APRN

## 2025-07-15 ENCOUNTER — TELEPHONE (OUTPATIENT)
Dept: CARDIOLOGY | Facility: CLINIC | Age: 70
End: 2025-07-15

## 2025-07-15 NOTE — TELEPHONE ENCOUNTER
----- Message from Davon Ronni sent at 7/15/2025  3:27 PM EDT -----  Regarding: RE: follow up next week  No concerning heart rhythm issues noted on monitor.  Continue current medical therapy.  ----- Message -----  From: Florence Mack RN  Sent: 7/15/2025   1:00 PM EDT  To: Davon Rudolph III, MD  Subject: FW: follow up next week                          Please advise.  ----- Message -----  From: Bibi Back APRN  Sent: 7/9/2025   9:57 AM EDT  To: Florence Mack RN  Subject: follow up next week                              Hitesh Henriquez,    I saw this very pleasant Rudolph pt today and suspect she may be having atrial flutter. I put a 24 hour Holter monitor on her today. Since I am out next week I wanted to make sure someone follows up on her monitor results (I put for Ronni to read). She is not currently on anticoagulation but is taking metoprolol tartrate 100 mg BID.    I'm sure the results will come to my basket but since she is Rudolph pt I wouldn't want another provider having to call her with results. If Ronni could address any abnormalities I would prefer his opinion.    I appreciate your help!    Thank you

## 2025-07-24 ENCOUNTER — TELEPHONE (OUTPATIENT)
Dept: CARDIOLOGY | Facility: CLINIC | Age: 70
End: 2025-07-24
Payer: MEDICARE

## 2025-07-24 NOTE — TELEPHONE ENCOUNTER
Lvm for pt to rtn call to [facility] appt per Bibi Back. With her if no order made to see Dr Rudolph.

## 2025-08-06 ENCOUNTER — HOSPITAL ENCOUNTER (OUTPATIENT)
Dept: GENERAL RADIOLOGY | Facility: HOSPITAL | Age: 70
Discharge: HOME | End: 2025-08-06
Admitting: FAMILY MEDICINE
Payer: MEDICARE

## 2025-08-06 ENCOUNTER — TRANSCRIBE ORDERS (OUTPATIENT)
Dept: GENERAL RADIOLOGY | Facility: HOSPITAL | Age: 70
End: 2025-08-06
Payer: MEDICARE

## 2025-08-06 DIAGNOSIS — R07.89 LEFT-SIDED CHEST WALL PAIN: Primary | ICD-10-CM

## 2025-08-06 DIAGNOSIS — R07.89 LEFT-SIDED CHEST WALL PAIN: ICD-10-CM

## 2025-08-06 PROCEDURE — 71101 X-RAY EXAM UNILAT RIBS/CHEST: CPT

## (undated) DEVICE — ENDOPATH XCEL BLADELESS TROCARS WITH STABILITY SLEEVES: Brand: ENDOPATH XCEL

## (undated) DEVICE — INCISIONLINE PLEDGET SFT 22X1 2.75MM

## (undated) DEVICE — PK CYSTO-TUR BASIC 10

## (undated) DEVICE — ENDOPATH XCEL UNIVERSAL TROCAR STABLILITY SLEEVES: Brand: ENDOPATH XCEL

## (undated) DEVICE — GLV SURG SENSICARE PI MIC PF SZ7.5 LF STRL

## (undated) DEVICE — SINGLE-USE BIOPSY FORCEPS: Brand: RADIAL JAW 4

## (undated) DEVICE — ANTIBACTERIAL UNDYED BRAIDED (POLYGLACTIN 910), SYNTHETIC ABSORBABLE SUTURE: Brand: COATED VICRYL

## (undated) DEVICE — SUT SILK 2/0 SH 30IN K833H

## (undated) DEVICE — JELLY,LUBE,STERILE,FLIP TOP,TUBE,2-OZ: Brand: MEDLINE

## (undated) DEVICE — BLANKT WARM UPPR/BDY ARM/OUT 57X196CM

## (undated) DEVICE — PATIENT RETURN ELECTRODE, SINGLE-USE, CONTACT QUALITY MONITORING, ADULT, WITH 9FT CORD, FOR PATIENTS WEIGING OVER 33LBS. (15KG): Brand: MEGADYNE

## (undated) DEVICE — PK SOL VISC ESOPH 80ML

## (undated) DEVICE — SUT SILK 0 SH 30IN K834H

## (undated) DEVICE — CONTN GRAD MEAS TRIANG 32OZ BLK

## (undated) DEVICE — SPNG ENDO BEDSIDE TUB ENZYM

## (undated) DEVICE — PK LAP LASR CHOLE 10

## (undated) DEVICE — GW ZIPWIRE STD/SHFT STR JB/8CM .035X150CM

## (undated) DEVICE — KT WARM LAP LIQUIDSCOPE/HELPOR W/WARMOR/CLTH 2/TROC/SWAB

## (undated) DEVICE — [HIGH FLOW INSUFFLATOR,  DO NOT USE IF PACKAGE IS DAMAGED,  KEEP DRY,  KEEP AWAY FROM SUNLIGHT,  PROTECT FROM HEAT AND RADIOACTIVE SOURCES.]: Brand: PNEUMOSURE

## (undated) DEVICE — GLV SURG SENSICARE W/ALOE PF LF 7.5 STRL

## (undated) DEVICE — SYR LUERLOK 50ML

## (undated) DEVICE — KT BIOGUARD SXN VLV AIR/H20 4PC DISP

## (undated) DEVICE — THE BITE BLOCK MAXI, LATEX FREE STRAP IS USED TO PROTECT THE ENDOSCOPE INSERTION TUBE FROM BEING BITTEN BY THE PATIENT.

## (undated) DEVICE — GOWN,PREVENTION PLUS,XXLARGE,STERILE: Brand: MEDLINE

## (undated) DEVICE — SOL IRR H2O BG 1000ML STRL

## (undated) DEVICE — GLV SURG SENSICARE PI MIC PF SZ7 LF STRL

## (undated) DEVICE — PENROSE DRAIN 18 X .5" SILICONE: Brand: MEDLINE

## (undated) DEVICE — APPL CHLORAPREP TINTED 26ML TEAL

## (undated) DEVICE — HYBRID CO2 TUBING/CAP SET FOR OLYMPUS® SCOPES & CO2 SOURCE: Brand: ERBE

## (undated) DEVICE — MARYLAND JAW LAPAROSCOPIC SEALER/DIVIDER COATED: Brand: LIGASURE

## (undated) DEVICE — PDS II VLT 0 107CM AG ST3: Brand: ENDOLOOP

## (undated) DEVICE — ENDOCUT SCISSOR TIP, DISPOSABLE: Brand: RENEW

## (undated) DEVICE — LAPAROSCOPIC SMOKE FILTRATION SYSTEM: Brand: PALL LAPAROSHIELD® PLUS LAPAROSCOPIC SMOKE FILTRATION SYSTEM

## (undated) DEVICE — INTENDED USE FOR SURGICAL MARKING ON INTACT SKIN, ALSO PROVIDES A PERMANENT METHOD OF IDENTIFYING OBJECTS IN THE OPERATING ROOM: Brand: WRITESITE® REGULAR TIP SKIN MARKER